# Patient Record
Sex: FEMALE | Race: WHITE | Employment: UNEMPLOYED | URBAN - NONMETROPOLITAN AREA
[De-identification: names, ages, dates, MRNs, and addresses within clinical notes are randomized per-mention and may not be internally consistent; named-entity substitution may affect disease eponyms.]

---

## 2019-07-16 VITALS
WEIGHT: 151 LBS | HEIGHT: 62 IN | TEMPERATURE: 98.4 F | SYSTOLIC BLOOD PRESSURE: 142 MMHG | DIASTOLIC BLOOD PRESSURE: 82 MMHG | BODY MASS INDEX: 27.79 KG/M2

## 2019-07-16 RX ORDER — METOPROLOL TARTRATE 50 MG/1
50 TABLET, FILM COATED ORAL 2 TIMES DAILY
COMMUNITY
End: 2019-07-17 | Stop reason: SDUPTHER

## 2019-07-16 RX ORDER — ATORVASTATIN CALCIUM 80 MG/1
80 TABLET, FILM COATED ORAL DAILY
COMMUNITY
End: 2019-07-17 | Stop reason: SDUPTHER

## 2019-07-16 RX ORDER — LORAZEPAM 2 MG/1
2 TABLET ORAL EVERY 6 HOURS PRN
COMMUNITY
End: 2019-07-17 | Stop reason: SDUPTHER

## 2019-07-17 ENCOUNTER — OFFICE VISIT (OUTPATIENT)
Dept: PRIMARY CARE CLINIC | Age: 59
End: 2019-07-17
Payer: COMMERCIAL

## 2019-07-17 VITALS
SYSTOLIC BLOOD PRESSURE: 122 MMHG | HEART RATE: 83 BPM | TEMPERATURE: 98.6 F | OXYGEN SATURATION: 94 % | DIASTOLIC BLOOD PRESSURE: 76 MMHG | HEIGHT: 62 IN | WEIGHT: 143 LBS | BODY MASS INDEX: 26.31 KG/M2

## 2019-07-17 DIAGNOSIS — Z12.12 ENCOUNTER FOR SCREENING FOR MALIGNANT NEOPLASM OF RECTUM: ICD-10-CM

## 2019-07-17 DIAGNOSIS — Z12.39 ENCOUNTER FOR SCREENING FOR MALIGNANT NEOPLASM OF BREAST: ICD-10-CM

## 2019-07-17 DIAGNOSIS — F41.9 ANXIETY: Primary | ICD-10-CM

## 2019-07-17 DIAGNOSIS — E78.2 MIXED HYPERLIPIDEMIA: ICD-10-CM

## 2019-07-17 DIAGNOSIS — I10 ESSENTIAL HYPERTENSION: ICD-10-CM

## 2019-07-17 PROCEDURE — 99214 OFFICE O/P EST MOD 30 MIN: CPT | Performed by: FAMILY MEDICINE

## 2019-07-17 RX ORDER — LORAZEPAM 2 MG/1
2 TABLET ORAL EVERY 6 HOURS PRN
Qty: 120 TABLET | Refills: 2 | Status: SHIPPED | OUTPATIENT
Start: 2019-07-17 | End: 2019-08-16

## 2019-07-17 RX ORDER — CLINDAMYCIN HYDROCHLORIDE 150 MG/1
150 CAPSULE ORAL 4 TIMES DAILY
Qty: 40 CAPSULE | Refills: 0 | Status: SHIPPED | OUTPATIENT
Start: 2019-07-17 | End: 2019-07-27

## 2019-07-17 RX ORDER — METOPROLOL TARTRATE 50 MG/1
50 TABLET, FILM COATED ORAL 2 TIMES DAILY
Qty: 30 TABLET | Refills: 3 | Status: SHIPPED | OUTPATIENT
Start: 2019-07-17 | End: 2019-07-22 | Stop reason: SDUPTHER

## 2019-07-17 RX ORDER — ATORVASTATIN CALCIUM 80 MG/1
80 TABLET, FILM COATED ORAL DAILY
Qty: 30 TABLET | Refills: 3 | Status: SHIPPED | OUTPATIENT
Start: 2019-07-17 | End: 2019-07-22 | Stop reason: SDUPTHER

## 2019-07-17 ASSESSMENT — PATIENT HEALTH QUESTIONNAIRE - PHQ9
1. LITTLE INTEREST OR PLEASURE IN DOING THINGS: 0
2. FEELING DOWN, DEPRESSED OR HOPELESS: 0
SUM OF ALL RESPONSES TO PHQ QUESTIONS 1-9: 0
SUM OF ALL RESPONSES TO PHQ QUESTIONS 1-9: 0
SUM OF ALL RESPONSES TO PHQ9 QUESTIONS 1 & 2: 0

## 2019-07-22 RX ORDER — ATORVASTATIN CALCIUM 80 MG/1
80 TABLET, FILM COATED ORAL DAILY
Qty: 30 TABLET | Refills: 3 | Status: SHIPPED | OUTPATIENT
Start: 2019-07-22 | End: 2019-10-16 | Stop reason: SDUPTHER

## 2019-07-22 RX ORDER — METOPROLOL TARTRATE 50 MG/1
50 TABLET, FILM COATED ORAL 2 TIMES DAILY
Qty: 30 TABLET | Refills: 3 | Status: SHIPPED | OUTPATIENT
Start: 2019-07-22 | End: 2019-08-26 | Stop reason: SDUPTHER

## 2019-08-26 RX ORDER — METOPROLOL TARTRATE 50 MG/1
50 TABLET, FILM COATED ORAL 2 TIMES DAILY
Qty: 60 TABLET | Refills: 3 | Status: SHIPPED | OUTPATIENT
Start: 2019-08-26 | End: 2019-10-16 | Stop reason: SDUPTHER

## 2019-08-26 RX ORDER — METOPROLOL TARTRATE 50 MG/1
50 TABLET, FILM COATED ORAL 2 TIMES DAILY
Qty: 60 TABLET | Refills: 3 | Status: SHIPPED | OUTPATIENT
Start: 2019-08-26 | End: 2019-08-26 | Stop reason: SDUPTHER

## 2019-10-16 ENCOUNTER — OFFICE VISIT (OUTPATIENT)
Dept: PRIMARY CARE CLINIC | Age: 59
End: 2019-10-16
Payer: COMMERCIAL

## 2019-10-16 VITALS
OXYGEN SATURATION: 95 % | BODY MASS INDEX: 24.11 KG/M2 | DIASTOLIC BLOOD PRESSURE: 68 MMHG | HEART RATE: 72 BPM | RESPIRATION RATE: 16 BRPM | WEIGHT: 131 LBS | TEMPERATURE: 98.2 F | SYSTOLIC BLOOD PRESSURE: 118 MMHG | HEIGHT: 62 IN

## 2019-10-16 DIAGNOSIS — E78.5 HYPERLIPIDEMIA, UNSPECIFIED HYPERLIPIDEMIA TYPE: ICD-10-CM

## 2019-10-16 DIAGNOSIS — F41.9 ANXIETY: ICD-10-CM

## 2019-10-16 DIAGNOSIS — I10 ESSENTIAL HYPERTENSION: Primary | ICD-10-CM

## 2019-10-16 DIAGNOSIS — Z12.39 ENCOUNTER FOR SCREENING FOR MALIGNANT NEOPLASM OF BREAST: ICD-10-CM

## 2019-10-16 DIAGNOSIS — Z12.11 ENCOUNTER FOR SCREENING FOR MALIGNANT NEOPLASM OF COLON: ICD-10-CM

## 2019-10-16 PROCEDURE — 99214 OFFICE O/P EST MOD 30 MIN: CPT | Performed by: FAMILY MEDICINE

## 2019-10-16 RX ORDER — METOPROLOL TARTRATE 50 MG/1
50 TABLET, FILM COATED ORAL 2 TIMES DAILY
Qty: 60 TABLET | Refills: 3 | Status: SHIPPED | OUTPATIENT
Start: 2019-10-16 | End: 2020-01-29 | Stop reason: SDUPTHER

## 2019-10-16 RX ORDER — ATORVASTATIN CALCIUM 80 MG/1
80 TABLET, FILM COATED ORAL DAILY
Qty: 30 TABLET | Refills: 3 | Status: SHIPPED | OUTPATIENT
Start: 2019-10-16 | End: 2019-11-22 | Stop reason: SDUPTHER

## 2019-11-22 RX ORDER — ATORVASTATIN CALCIUM 80 MG/1
80 TABLET, FILM COATED ORAL DAILY
Qty: 30 TABLET | Refills: 3 | Status: SHIPPED
Start: 2019-11-22 | End: 2020-04-21 | Stop reason: SDUPTHER

## 2020-01-29 ENCOUNTER — HOSPITAL ENCOUNTER (OUTPATIENT)
Age: 60
Discharge: HOME | End: 2020-01-31
Payer: COMMERCIAL

## 2020-01-29 ENCOUNTER — OFFICE VISIT (OUTPATIENT)
Dept: PRIMARY CARE CLINIC | Age: 60
End: 2020-01-29
Payer: COMMERCIAL

## 2020-01-29 VITALS
HEIGHT: 62 IN | TEMPERATURE: 97.4 F | DIASTOLIC BLOOD PRESSURE: 70 MMHG | BODY MASS INDEX: 23.74 KG/M2 | OXYGEN SATURATION: 95 % | HEART RATE: 64 BPM | WEIGHT: 129 LBS | SYSTOLIC BLOOD PRESSURE: 118 MMHG | RESPIRATION RATE: 18 BRPM

## 2020-01-29 LAB
AMPHETAMINE SCREEN, URINE: NOT DETECTED
BARBITURATE SCREEN URINE: NOT DETECTED
BENZODIAZEPINE SCREEN, URINE: NOT DETECTED
CANNABINOID SCREEN URINE: NOT DETECTED
COCAINE METABOLITE SCREEN URINE: NOT DETECTED
FENTANYL SCREEN, URINE: NOT DETECTED
Lab: NORMAL
METHADONE SCREEN, URINE: NOT DETECTED
OPIATE SCREEN URINE: NOT DETECTED
OXYCODONE URINE: NOT DETECTED
PHENCYCLIDINE SCREEN URINE: NOT DETECTED

## 2020-01-29 PROCEDURE — 99214 OFFICE O/P EST MOD 30 MIN: CPT | Performed by: FAMILY MEDICINE

## 2020-01-29 PROCEDURE — 80307 DRUG TEST PRSMV CHEM ANLYZR: CPT

## 2020-01-29 RX ORDER — METOPROLOL TARTRATE 50 MG/1
50 TABLET, FILM COATED ORAL 2 TIMES DAILY
Qty: 60 TABLET | Refills: 3 | Status: SHIPPED
Start: 2020-01-29 | End: 2020-04-21 | Stop reason: SDUPTHER

## 2020-01-29 ASSESSMENT — PATIENT HEALTH QUESTIONNAIRE - PHQ9
SUM OF ALL RESPONSES TO PHQ9 QUESTIONS 1 & 2: 0
SUM OF ALL RESPONSES TO PHQ QUESTIONS 1-9: 0
SUM OF ALL RESPONSES TO PHQ QUESTIONS 1-9: 0
1. LITTLE INTEREST OR PLEASURE IN DOING THINGS: 0
2. FEELING DOWN, DEPRESSED OR HOPELESS: 0
DEPRESSION UNABLE TO ASSESS: FUNCTIONAL CAPACITY MOTIVATION LIMITS ACCURACY

## 2020-01-29 NOTE — PROGRESS NOTES
Rfl: 3    Allergies   Allergen Reactions    Penicillin G        Past Medical History:   Diagnosis Date    Anxiety     Hyperlipidemia     Hypertension     Irritable bowel syndrome      Social History     Socioeconomic History    Marital status:      Spouse name: Not on file    Number of children: Not on file    Years of education: Not on file    Highest education level: Not on file   Occupational History     Employer: NOT EMPLOYED   Social Needs    Financial resource strain: Not on file    Food insecurity:     Worry: Not on file     Inability: Not on file    Transportation needs:     Medical: Not on file     Non-medical: Not on file   Tobacco Use    Smoking status: Former Smoker     Packs/day: 1.00     Years: 40.00     Pack years: 40.00     Types: Cigarettes     Last attempt to quit: 2017     Years since quittin.5    Smokeless tobacco: Never Used   Substance and Sexual Activity    Alcohol use: Not on file    Drug use: Not on file    Sexual activity: Not on file   Lifestyle    Physical activity:     Days per week: Not on file     Minutes per session: Not on file    Stress: Not on file   Relationships    Social connections:     Talks on phone: Not on file     Gets together: Not on file     Attends Holiness service: Not on file     Active member of club or organization: Not on file     Attends meetings of clubs or organizations: Not on file     Relationship status: Not on file    Intimate partner violence:     Fear of current or ex partner: Not on file     Emotionally abused: Not on file     Physically abused: Not on file     Forced sexual activity: Not on file   Other Topics Concern    Not on file   Social History Narrative    Not on file     No past surgical history on file. No family history on file.      Vitals:    20 1221   BP: 118/70   Pulse: 64   Resp: 18   Temp: 97.4 °F (36.3 °C)   SpO2: 95%   Weight: 129 lb (58.5 kg)   Height: 5' 2\" (1.575 m)        Exam: Const:

## 2020-04-21 ENCOUNTER — VIRTUAL VISIT (OUTPATIENT)
Dept: PRIMARY CARE CLINIC | Age: 60
End: 2020-04-21
Payer: COMMERCIAL

## 2020-04-21 PROCEDURE — 99441 PR PHYS/QHP TELEPHONE EVALUATION 5-10 MIN: CPT | Performed by: FAMILY MEDICINE

## 2020-04-21 RX ORDER — ATORVASTATIN CALCIUM 80 MG/1
80 TABLET, FILM COATED ORAL DAILY
Qty: 30 TABLET | Refills: 3 | Status: SHIPPED
Start: 2020-04-21 | End: 2020-07-30 | Stop reason: SDUPTHER

## 2020-04-21 RX ORDER — METOPROLOL TARTRATE 50 MG/1
50 TABLET, FILM COATED ORAL 2 TIMES DAILY
Qty: 60 TABLET | Refills: 3 | Status: SHIPPED
Start: 2020-04-21 | End: 2020-05-22 | Stop reason: SDUPTHER

## 2020-04-21 NOTE — PROGRESS NOTES
Angy Ahumada is a 60 y.o. female evaluated via telephone on 4/21/2020. Consent:  She and/or health care decision maker is aware that that she may receive a bill for this telephone service, depending on her insurance coverage, and has provided verbal consent to proceed: Yes      Documentation:  I communicated with the patient and/or health care decision maker about hypertension and hyperlipidemia. Details of this discussion including any medical advice provided: This 43-year-old female presents today for a follow-up evaluation of her chronic medical problems including hyperlipidemia and hypertension. Current medication list reviewed. The patient is tolerating all medications well without adverse events or known side effects. The patient is not up-to-date on all age-appropriate wellness issues. Assessment #1 hypertension #2 hyperlipidemia plan #1 refill medications #2 schedule office visit follow-up 3 months. I affirm this is a Patient Initiated Episode with an Established Patient who has not had a related appointment within my department in the past 7 days or scheduled within the next 24 hours. Total Time: minutes: 5-10 minutes.     Note: not billable if this call serves to triage the patient into an appointment for the relevant concern      Eli Petersen

## 2020-05-22 ENCOUNTER — VIRTUAL VISIT (OUTPATIENT)
Dept: PRIMARY CARE CLINIC | Age: 60
End: 2020-05-22
Payer: COMMERCIAL

## 2020-05-22 PROCEDURE — 99442 PR PHYS/QHP TELEPHONE EVALUATION 11-20 MIN: CPT | Performed by: FAMILY MEDICINE

## 2020-05-22 RX ORDER — METOPROLOL TARTRATE 50 MG/1
50 TABLET, FILM COATED ORAL 2 TIMES DAILY
Qty: 60 TABLET | Refills: 3 | Status: SHIPPED
Start: 2020-05-22 | End: 2020-09-10 | Stop reason: SDUPTHER

## 2020-07-30 RX ORDER — ATORVASTATIN CALCIUM 80 MG/1
80 TABLET, FILM COATED ORAL DAILY
Qty: 30 TABLET | Refills: 3 | Status: SHIPPED
Start: 2020-07-30 | End: 2020-07-30 | Stop reason: SDUPTHER

## 2020-07-30 RX ORDER — ATORVASTATIN CALCIUM 80 MG/1
80 TABLET, FILM COATED ORAL DAILY
Qty: 30 TABLET | Refills: 3 | Status: SHIPPED
Start: 2020-07-30 | End: 2020-09-10 | Stop reason: SDUPTHER

## 2020-07-30 NOTE — TELEPHONE ENCOUNTER
Last Appointment:  1/29/2020  Future Appointments   Date Time Provider Stalin Tsai   8/24/2020 12:20 PM Sindy Cavazos  [facility]

## 2020-08-18 ENCOUNTER — TELEPHONE (OUTPATIENT)
Dept: PRIMARY CARE CLINIC | Age: 60
End: 2020-08-18

## 2020-08-18 RX ORDER — CLINDAMYCIN HYDROCHLORIDE 150 MG/1
150 CAPSULE ORAL 4 TIMES DAILY
Qty: 40 CAPSULE | Refills: 0 | Status: SHIPPED | OUTPATIENT
Start: 2020-08-18 | End: 2020-08-28

## 2020-08-18 NOTE — TELEPHONE ENCOUNTER
Patient is calling in requesting an antibiotic, she states she has bad teeth and she cannot eat or sleep.

## 2020-09-08 RX ORDER — TICAGRELOR 90 MG/1
TABLET ORAL
Qty: 60 TABLET | Refills: 0 | Status: SHIPPED
Start: 2020-09-08 | End: 2020-09-10 | Stop reason: SDUPTHER

## 2020-09-10 ENCOUNTER — HOSPITAL ENCOUNTER (OUTPATIENT)
Age: 60
Discharge: HOME | End: 2020-09-12
Payer: COMMERCIAL

## 2020-09-10 ENCOUNTER — OFFICE VISIT (OUTPATIENT)
Dept: PRIMARY CARE CLINIC | Age: 60
End: 2020-09-10
Payer: COMMERCIAL

## 2020-09-10 VITALS
DIASTOLIC BLOOD PRESSURE: 70 MMHG | WEIGHT: 138 LBS | BODY MASS INDEX: 24.45 KG/M2 | HEART RATE: 71 BPM | SYSTOLIC BLOOD PRESSURE: 132 MMHG | HEIGHT: 63 IN | OXYGEN SATURATION: 95 % | RESPIRATION RATE: 16 BRPM

## 2020-09-10 LAB
ANION GAP SERPL CALCULATED.3IONS-SCNC: 13 MMOL/L (ref 7–16)
BUN BLDV-MCNC: 12 MG/DL (ref 8–23)
CALCIUM SERPL-MCNC: 9.6 MG/DL (ref 8.6–10.2)
CHLORIDE BLD-SCNC: 104 MMOL/L (ref 98–107)
CHOLESTEROL, TOTAL: 135 MG/DL (ref 0–199)
CO2: 25 MMOL/L (ref 22–29)
CREAT SERPL-MCNC: 0.6 MG/DL (ref 0.5–1)
GFR AFRICAN AMERICAN: >60
GFR NON-AFRICAN AMERICAN: >60 ML/MIN/1.73
GLUCOSE BLD-MCNC: 86 MG/DL (ref 74–99)
HDLC SERPL-MCNC: 39 MG/DL
LDL CHOLESTEROL CALCULATED: 69 MG/DL (ref 0–99)
POTASSIUM SERPL-SCNC: 4.4 MMOL/L (ref 3.5–5)
SODIUM BLD-SCNC: 142 MMOL/L (ref 132–146)
TRIGL SERPL-MCNC: 134 MG/DL (ref 0–149)
VLDLC SERPL CALC-MCNC: 27 MG/DL

## 2020-09-10 PROCEDURE — 86803 HEPATITIS C AB TEST: CPT

## 2020-09-10 PROCEDURE — 80061 LIPID PANEL: CPT

## 2020-09-10 PROCEDURE — 80048 BASIC METABOLIC PNL TOTAL CA: CPT

## 2020-09-10 PROCEDURE — 99214 OFFICE O/P EST MOD 30 MIN: CPT | Performed by: FAMILY MEDICINE

## 2020-09-10 PROCEDURE — 86703 HIV-1/HIV-2 1 RESULT ANTBDY: CPT

## 2020-09-10 PROCEDURE — 36415 COLL VENOUS BLD VENIPUNCTURE: CPT

## 2020-09-10 RX ORDER — METOPROLOL TARTRATE 50 MG/1
50 TABLET, FILM COATED ORAL 2 TIMES DAILY
Qty: 60 TABLET | Refills: 3 | Status: SHIPPED
Start: 2020-09-10 | End: 2020-12-14

## 2020-09-10 RX ORDER — ATORVASTATIN CALCIUM 80 MG/1
80 TABLET, FILM COATED ORAL DAILY
Qty: 30 TABLET | Refills: 3 | Status: SHIPPED
Start: 2020-09-10 | End: 2021-01-05 | Stop reason: SDUPTHER

## 2020-09-10 NOTE — PROGRESS NOTES
9/10/2020     Isis Cosme    : 1960 Sex: female   Age: 63 y.o. Chief Complaint   Patient presents with    Anxiety    Hypertension       HPI: This 63y.o. -year-old female  presents today for evaluation and management of her  chronic medical problems. Current medication list reviewed. The patient is tolerating all medications well without adverse events or known side effects. The patient does understand the risk and benefits of the prescribed medications. The patient is up-to-date on all age-appropriate wellness issues. ROS:   Const: Denies changes in appetite, chills, fever, night sweats and weight loss. Eyes:  Denies discharge, a recent change in visual acuity, blurred vision and double vision. ENMT: Denies discharge of the ears, hearing loss, pain of the ears. Denies nasal or sinus symptoms other than stated above. Denies mouth or throat symptoms. CV:  Denies chest pain, dyspnea on exertion, orthopnea, palpitations and PND  Resp: Denies chest pain, cough, SOB and wheezing. GI: Denies abdominal pain, constipation, diarrhea, heartburn, indigestion, nausea and vomiting. : Denies dysuria, frequency, hematuria, nocturia and urgency. Musculo: Denies arthralgias and myalgia  Skin:  Denies lesions, pruritus and rash. Neuro: Denies dizziness, lightheadedness, numbness, tingling and weakness. Psych:  Denies anxiety and depression  Endocrine: Denies anxiety and depression. Hema/Lymph: Denies hematologic symptoms  Allergy/Immuno:  Denies allergic/immunologic symptoms.   Pertinent positives reviewed and noted      Current Outpatient Medications:     atorvastatin (LIPITOR) 80 MG tablet, Take 1 tablet by mouth daily, Disp: 30 tablet, Rfl: 3    ticagrelor (BRILINTA) 90 MG TABS tablet, TAKE ONE TABLET BY MOUTH TWO TIMES A DAY, Disp: 60 tablet, Rfl: 3    metoprolol tartrate (LOPRESSOR) 50 MG tablet, Take 1 tablet by mouth 2 times daily, Disp: 60 tablet, Rfl: 3    Allergies   Allergen Reactions    Penicillin G        Past Medical History:   Diagnosis Date    Anxiety     Hyperlipidemia     Hypertension     Irritable bowel syndrome      Social History     Socioeconomic History    Marital status:      Spouse name: Not on file    Number of children: Not on file    Years of education: Not on file    Highest education level: Not on file   Occupational History     Employer: NOT EMPLOYED   Social Needs    Financial resource strain: Not on file    Food insecurity     Worry: Not on file     Inability: Not on file    Transportation needs     Medical: Not on file     Non-medical: Not on file   Tobacco Use    Smoking status: Former Smoker     Packs/day: 1.00     Years: 40.00     Pack years: 40.00     Types: Cigarettes     Last attempt to quit: 7/17/2017     Years since quitting: 3.1    Smokeless tobacco: Never Used   Substance and Sexual Activity    Alcohol use: Not on file    Drug use: Not on file    Sexual activity: Not on file   Lifestyle    Physical activity     Days per week: Not on file     Minutes per session: Not on file    Stress: Not on file   Relationships    Social connections     Talks on phone: Not on file     Gets together: Not on file     Attends Denominational service: Not on file     Active member of club or organization: Not on file     Attends meetings of clubs or organizations: Not on file     Relationship status: Not on file    Intimate partner violence     Fear of current or ex partner: Not on file     Emotionally abused: Not on file     Physically abused: Not on file     Forced sexual activity: Not on file   Other Topics Concern    Not on file   Social History Narrative    Not on file     History reviewed. No pertinent surgical history. History reviewed. No pertinent family history. Vitals:    09/10/20 0831   BP: 132/70   Pulse: 71   Resp: 16   SpO2: 95%   Weight: 138 lb (62.6 kg)   Height: 5' 2.5\" (1.588 m)        Exam: Const: Appears healthy and well developed.   No signs of acute distress present. Eyes: PERRL  ENMT: Tympanic membranes are intact. Nasal mucosa intact without noted erythema Septum is in the midline. Posterior pharynx shows no exudate, irritation or redness. Neck:  Supple without adenopathy. Adequate range of motion   Resp: No rales, rhonchi, wheezes appreciated over the lungs bilaterally. CV: S1, S2 within normal limits. Regular rate and rhythm noted. Without murmur, gallop or rub. Extremities:  Pulses intact. Without noted edema. Abdomen: Positive bowel sounds. Palpation reveals softness, with no distension, organomegaly or tenderness. No abdominal masses palpable. Skin: Skin is warm and dry. Musculo: Unchanged upon examination. Neuro: Alert and oriented X3. Cranial nerves grossly intact. Psych: Mood is normal.  Affect is normal.   Vital signs reviewed. Controlled Substances Monitoring:     No flowsheet data found. Plan Per Assessment:  Benson Schlatter was seen today for anxiety and hypertension. Diagnoses and all orders for this visit:    Essential hypertension  -     Basic Metabolic Panel; Future  -     Lipid Panel; Future    Hyperlipidemia, unspecified hyperlipidemia type  -     Lipid Panel; Future    Anxiety    Encounter for screening for HIV  -     HIV Screen; Future    Encounter for hepatitis C screening test for low risk patient  -     Hepatitis C Antibody; Future    Other orders  -     atorvastatin (LIPITOR) 80 MG tablet; Take 1 tablet by mouth daily  -     ticagrelor (BRILINTA) 90 MG TABS tablet; TAKE ONE TABLET BY MOUTH TWO TIMES A DAY  -     metoprolol tartrate (LOPRESSOR) 50 MG tablet; Take 1 tablet by mouth 2 times daily        Return in about 3 months (around 12/10/2020) for MEDICATION CHECK, FOLLOW UP [address] [facility] Yoselin oMmin MD    Note was generated with the assistance of voice recognition software. Document was reviewed however may contain grammatical errors.

## 2020-09-11 LAB
HEPATITIS C ANTIBODY INTERPRETATION: NORMAL
HIV-1 AND HIV-2 ANTIBODIES: NORMAL

## 2020-11-18 NOTE — TELEPHONE ENCOUNTER
Last Appointment:  9/10/2020  Future Appointments   Date Time Provider Stalin Tsai   12/16/2020  9:20 AM Awais Jane  [facility]      Patient needs pended med refilled.     Electronically signed by Jaleesa Valle LPN on 84/55/2227 at 10:19 AM

## 2020-12-14 RX ORDER — METOPROLOL TARTRATE 50 MG/1
TABLET, FILM COATED ORAL
Qty: 60 TABLET | Refills: 0 | Status: SHIPPED
Start: 2020-12-14 | End: 2020-12-16 | Stop reason: SDUPTHER

## 2020-12-16 RX ORDER — METOPROLOL TARTRATE 50 MG/1
TABLET, FILM COATED ORAL
Qty: 60 TABLET | Refills: 0 | Status: SHIPPED
Start: 2020-12-16 | End: 2021-01-05 | Stop reason: SDUPTHER

## 2021-01-05 ENCOUNTER — OFFICE VISIT (OUTPATIENT)
Dept: PRIMARY CARE CLINIC | Age: 61
End: 2021-01-05
Payer: COMMERCIAL

## 2021-01-05 VITALS
HEIGHT: 62 IN | BODY MASS INDEX: 22.63 KG/M2 | DIASTOLIC BLOOD PRESSURE: 68 MMHG | WEIGHT: 123 LBS | RESPIRATION RATE: 16 BRPM | SYSTOLIC BLOOD PRESSURE: 124 MMHG

## 2021-01-05 DIAGNOSIS — I10 ESSENTIAL HYPERTENSION: Primary | ICD-10-CM

## 2021-01-05 DIAGNOSIS — E78.5 HYPERLIPIDEMIA, UNSPECIFIED HYPERLIPIDEMIA TYPE: ICD-10-CM

## 2021-01-05 PROCEDURE — 99213 OFFICE O/P EST LOW 20 MIN: CPT | Performed by: FAMILY MEDICINE

## 2021-01-05 RX ORDER — ATORVASTATIN CALCIUM 80 MG/1
80 TABLET, FILM COATED ORAL DAILY
Qty: 30 TABLET | Refills: 3 | Status: SHIPPED
Start: 2021-01-05 | End: 2021-04-14 | Stop reason: SDUPTHER

## 2021-01-05 RX ORDER — METOPROLOL TARTRATE 50 MG/1
TABLET, FILM COATED ORAL
Qty: 60 TABLET | Refills: 0 | Status: SHIPPED
Start: 2021-01-05 | End: 2021-02-11

## 2021-01-05 ASSESSMENT — PATIENT HEALTH QUESTIONNAIRE - PHQ9
SUM OF ALL RESPONSES TO PHQ QUESTIONS 1-9: 0
2. FEELING DOWN, DEPRESSED OR HOPELESS: 0
SUM OF ALL RESPONSES TO PHQ QUESTIONS 1-9: 0

## 2021-01-05 NOTE — PROGRESS NOTES
TABS tablet, TAKE ONE TABLET BY MOUTH TWO TIMES A DAY, Disp: 60 tablet, Rfl: 3    Allergies   Allergen Reactions    Penicillin G        Past Medical History:   Diagnosis Date    Anxiety     Hyperlipidemia     Hypertension     Irritable bowel syndrome      Social History     Socioeconomic History    Marital status:      Spouse name: Not on file    Number of children: Not on file    Years of education: Not on file    Highest education level: Not on file   Occupational History     Employer: NOT EMPLOYED   Social Needs    Financial resource strain: Not on file    Food insecurity     Worry: Not on file     Inability: Not on file    Transportation needs     Medical: Not on file     Non-medical: Not on file   Tobacco Use    Smoking status: Former Smoker     Packs/day: 1.00     Years: 40.00     Pack years: 40.00     Types: Cigarettes     Quit date: 7/17/2017     Years since quitting: 3.4    Smokeless tobacco: Never Used   Substance and Sexual Activity    Alcohol use: Not on file    Drug use: Not on file    Sexual activity: Not on file   Lifestyle    Physical activity     Days per week: Not on file     Minutes per session: Not on file    Stress: Not on file   Relationships    Social connections     Talks on phone: Not on file     Gets together: Not on file     Attends Buddhism service: Not on file     Active member of club or organization: Not on file     Attends meetings of clubs or organizations: Not on file     Relationship status: Not on file    Intimate partner violence     Fear of current or ex partner: Not on file     Emotionally abused: Not on file     Physically abused: Not on file     Forced sexual activity: Not on file   Other Topics Concern    Not on file   Social History Narrative    Not on file     History reviewed. No pertinent surgical history. History reviewed. No pertinent family history.      Vitals:    01/05/21 1256   BP: 124/68   Resp: 16   Weight: 123 lb (55.8 kg) Height: 5' 2\" (1.575 m)        Exam: Const: Appears healthy and well developed. No signs of acute distress present. Eyes: PERRL  ENMT: Tympanic membranes are intact. Nasal mucosa intact without noted erythema Septum is in the midline. Posterior pharynx shows no exudate, irritation or redness. Neck:  Supple without adenopathy. Adequate range of motion   Resp: No rales, rhonchi, wheezes appreciated over the lungs bilaterally. CV: S1, S2 within normal limits. Regular rate and rhythm noted. Without murmur, gallop or rub. Extremities:  Pulses intact. Without noted edema. Abdomen: Positive bowel sounds. Palpation reveals softness, with no distension, organomegaly or tenderness. No abdominal masses palpable. Skin: Skin is warm and dry. Musculo: Unchanged upon examination. Neuro: Alert and oriented X3. Cranial nerves grossly intact. Psych: Mood is normal.  Affect is normal.   Vital signs reviewed. Controlled Substances Monitoring:     No flowsheet data found. Plan Per Assessment:  Jeanice Epley was seen today for hyperlipidemia and hypertension. Diagnoses and all orders for this visit:    Essential hypertension    Hyperlipidemia, unspecified hyperlipidemia type    Other orders  -     atorvastatin (LIPITOR) 80 MG tablet; Take 1 tablet by mouth daily  -     metoprolol tartrate (LOPRESSOR) 50 MG tablet; TAKE ONE TABLET BY MOUTH TWO TIMES A DAY  -     ticagrelor (BRILINTA) 90 MG TABS tablet; TAKE ONE TABLET BY MOUTH TWO TIMES A DAY        Return in about 3 months (around 4/5/2021) for MEDICATION CHECK, FOLLOW UP CHRONIC MEDICAL PROBLEMS. Daniel Young MD    Note was generated with the assistance of voice recognition software. Document was reviewed however may contain grammatical errors.

## 2021-02-11 RX ORDER — METOPROLOL TARTRATE 50 MG/1
TABLET, FILM COATED ORAL
Qty: 60 TABLET | Refills: 0 | Status: SHIPPED
Start: 2021-02-11 | End: 2021-03-17 | Stop reason: SDUPTHER

## 2021-02-11 NOTE — TELEPHONE ENCOUNTER
Last Appointment:  1/5/2021  Future Appointments   Date Time Provider Stalin Tsai   4/13/2021 12:10 PM Hanna Gaytan  [facility]

## 2021-03-17 RX ORDER — METOPROLOL TARTRATE 50 MG/1
TABLET, FILM COATED ORAL
Qty: 60 TABLET | Refills: 0 | Status: SHIPPED
Start: 2021-03-17 | End: 2021-04-14 | Stop reason: SDUPTHER

## 2021-04-14 ENCOUNTER — TELEPHONE (OUTPATIENT)
Dept: ADMINISTRATIVE | Age: 61
End: 2021-04-14

## 2021-04-14 RX ORDER — ATORVASTATIN CALCIUM 80 MG/1
80 TABLET, FILM COATED ORAL DAILY
Qty: 30 TABLET | Refills: 3 | Status: SHIPPED
Start: 2021-04-14 | End: 2021-04-20 | Stop reason: SDUPTHER

## 2021-04-14 RX ORDER — METOPROLOL TARTRATE 50 MG/1
TABLET, FILM COATED ORAL
Qty: 60 TABLET | Refills: 0 | Status: SHIPPED
Start: 2021-04-14 | End: 2021-04-20 | Stop reason: SDUPTHER

## 2021-04-14 NOTE — TELEPHONE ENCOUNTER
Patient request refill of the following:    metoprolol tartrate (LOPRESSOR) 50 MG tablet  atorvastatin (LIPITOR) 80 MG tablet  ticagrelor (BRILINTA) 90 MG TABS tablet      [facility] [place]

## 2021-04-14 NOTE — TELEPHONE ENCOUNTER
Last Appointment:  4/13/2021  Future Appointments   Date Time Provider Stalin Tsai   4/20/2021 10:50 AM Susie Maier  [facility]

## 2021-04-20 ENCOUNTER — OFFICE VISIT (OUTPATIENT)
Dept: PRIMARY CARE CLINIC | Age: 61
End: 2021-04-20
Payer: COMMERCIAL

## 2021-04-20 VITALS
DIASTOLIC BLOOD PRESSURE: 64 MMHG | BODY MASS INDEX: 21.79 KG/M2 | HEIGHT: 63 IN | SYSTOLIC BLOOD PRESSURE: 122 MMHG | RESPIRATION RATE: 16 BRPM | WEIGHT: 123 LBS | HEART RATE: 87 BPM | OXYGEN SATURATION: 90 %

## 2021-04-20 DIAGNOSIS — E78.5 HYPERLIPIDEMIA, UNSPECIFIED HYPERLIPIDEMIA TYPE: ICD-10-CM

## 2021-04-20 DIAGNOSIS — Z12.12 ENCOUNTER FOR SCREENING FOR MALIGNANT NEOPLASM OF RECTUM: ICD-10-CM

## 2021-04-20 DIAGNOSIS — I10 ESSENTIAL HYPERTENSION: Primary | ICD-10-CM

## 2021-04-20 DIAGNOSIS — Z12.31 ENCOUNTER FOR SCREENING MAMMOGRAM FOR MALIGNANT NEOPLASM OF BREAST: ICD-10-CM

## 2021-04-20 DIAGNOSIS — F41.9 ANXIETY: ICD-10-CM

## 2021-04-20 PROCEDURE — 99214 OFFICE O/P EST MOD 30 MIN: CPT | Performed by: FAMILY MEDICINE

## 2021-04-20 RX ORDER — METOPROLOL TARTRATE 50 MG/1
TABLET, FILM COATED ORAL
Qty: 60 TABLET | Refills: 0 | Status: SHIPPED
Start: 2021-04-20 | End: 2021-06-14 | Stop reason: SDUPTHER

## 2021-04-20 RX ORDER — ATORVASTATIN CALCIUM 80 MG/1
80 TABLET, FILM COATED ORAL DAILY
Qty: 30 TABLET | Refills: 3 | Status: SHIPPED | OUTPATIENT
Start: 2021-04-20 | End: 2021-09-10 | Stop reason: SDUPTHER

## 2021-04-20 NOTE — PROGRESS NOTES
2021     Zoila Phillips    : 1960 Sex: female   Age: 63 y.o. Chief Complaint   Patient presents with    Hypertension       HPI: This 63y.o. -year-old female  presents today for evaluation and management of her  chronic medical problems. Current medication list reviewed. The patient is tolerating all medications well without adverse events or known side effects. The patient does understand the risk and benefits of the prescribed medications. The patient is not up-to-date on all age-appropriate wellness issues. ROS:   Const: Denies changes in appetite, chills, fever, night sweats and weight loss. Eyes:  Denies discharge, a recent change in visual acuity, blurred vision and double vision. ENMT: Denies discharge of the ears, hearing loss, pain of the ears. Denies nasal or sinus symptoms other than stated above. Denies mouth or throat symptoms. CV:  Denies chest pain, dyspnea on exertion, orthopnea, palpitations and PND  Resp: Denies chest pain, cough, SOB and wheezing. GI: Denies abdominal pain, constipation, diarrhea, heartburn, indigestion, nausea and vomiting. : Denies dysuria, frequency, hematuria, nocturia and urgency. Musculo: Denies arthralgias and myalgia  Skin:  Denies lesions, pruritus and rash. Neuro: Denies dizziness, lightheadedness, numbness, tingling and weakness. Psych:  Denies anxiety and depression  Endocrine: Denies polyuria, polydipsia, polyphagia, weight gain, dry skin, constipation, fatigue, cold intolerance, heat intolerance or tremors. Hema/Lymph: Denies hematologic symptoms  Allergy/Immuno:  Denies allergic/immunologic symptoms.   Pertinent positives reviewed and noted      Current Outpatient Medications:     metoprolol tartrate (LOPRESSOR) 50 MG tablet, TAKE 1 TABLET BY MOUTH TWICE DAILY, Disp: 60 tablet, Rfl: 0    ticagrelor (BRILINTA) 90 MG TABS tablet, TAKE ONE TABLET BY MOUTH TWO TIMES A DAY, Disp: 60 tablet, Rfl: 3    atorvastatin (LIPITOR) 80 MG tablet, grammatical errors.

## 2021-06-14 RX ORDER — METOPROLOL TARTRATE 50 MG/1
TABLET, FILM COATED ORAL
Qty: 60 TABLET | Refills: 0 | Status: SHIPPED
Start: 2021-06-14 | End: 2021-07-16 | Stop reason: SDUPTHER

## 2021-06-14 NOTE — TELEPHONE ENCOUNTER
Pt was given new patient line to establish with new provider. She stated she was out of medication yesterday and needs this medication ASAP.

## 2021-07-16 RX ORDER — METOPROLOL TARTRATE 50 MG/1
TABLET, FILM COATED ORAL
Qty: 60 TABLET | Refills: 0 | Status: SHIPPED
Start: 2021-07-16 | End: 2021-08-18 | Stop reason: SDUPTHER

## 2021-08-18 RX ORDER — METOPROLOL TARTRATE 50 MG/1
TABLET, FILM COATED ORAL
Qty: 60 TABLET | Refills: 0 | Status: SHIPPED
Start: 2021-08-18 | End: 2021-09-10 | Stop reason: SDUPTHER

## 2021-09-10 ENCOUNTER — VIRTUAL VISIT (OUTPATIENT)
Dept: PRIMARY CARE CLINIC | Age: 61
End: 2021-09-10
Payer: MEDICAID

## 2021-09-10 DIAGNOSIS — Z12.11 SCREENING FOR COLON CANCER: ICD-10-CM

## 2021-09-10 DIAGNOSIS — R41.3 MEMORY LOSS, SHORT TERM: ICD-10-CM

## 2021-09-10 DIAGNOSIS — I10 ESSENTIAL HYPERTENSION: Primary | ICD-10-CM

## 2021-09-10 DIAGNOSIS — E78.5 HYPERLIPIDEMIA, UNSPECIFIED HYPERLIPIDEMIA TYPE: ICD-10-CM

## 2021-09-10 DIAGNOSIS — I25.10 CORONARY ARTERY DISEASE, UNSPECIFIED VESSEL OR LESION TYPE, UNSPECIFIED WHETHER ANGINA PRESENT, UNSPECIFIED WHETHER NATIVE OR TRANSPLANTED HEART: ICD-10-CM

## 2021-09-10 DIAGNOSIS — Z72.0 TOBACCO ABUSE: ICD-10-CM

## 2021-09-10 PROCEDURE — 99214 OFFICE O/P EST MOD 30 MIN: CPT | Performed by: NURSE PRACTITIONER

## 2021-09-10 RX ORDER — ATORVASTATIN CALCIUM 80 MG/1
80 TABLET, FILM COATED ORAL DAILY
Qty: 30 TABLET | Refills: 3 | Status: SHIPPED
Start: 2021-09-10 | End: 2021-10-28 | Stop reason: SDUPTHER

## 2021-09-10 RX ORDER — METOPROLOL TARTRATE 50 MG/1
TABLET, FILM COATED ORAL
Qty: 60 TABLET | Refills: 0 | Status: SHIPPED
Start: 2021-09-10 | Stop reason: SDUPTHER

## 2021-09-10 SDOH — ECONOMIC STABILITY: FOOD INSECURITY: WITHIN THE PAST 12 MONTHS, THE FOOD YOU BOUGHT JUST DIDN'T LAST AND YOU DIDN'T HAVE MONEY TO GET MORE.: NEVER TRUE

## 2021-09-10 SDOH — ECONOMIC STABILITY: FOOD INSECURITY: WITHIN THE PAST 12 MONTHS, YOU WORRIED THAT YOUR FOOD WOULD RUN OUT BEFORE YOU GOT MONEY TO BUY MORE.: NEVER TRUE

## 2021-09-10 ASSESSMENT — SOCIAL DETERMINANTS OF HEALTH (SDOH): HOW HARD IS IT FOR YOU TO PAY FOR THE VERY BASICS LIKE FOOD, HOUSING, MEDICAL CARE, AND HEATING?: NOT HARD AT ALL

## 2021-09-10 NOTE — PROGRESS NOTES
Vik Graham is a 57 y.o. female evaluated via telephone on 9/10/2021. Consent:  She and/or health care decision maker is aware that that she may receive a bill for this telephone service, depending on her insurance coverage, and has provided verbal consent to proceed: Yes      Documentation:  I communicated with the patient and/or health care decision maker about hypertension, hyperlipidemia, CAD and short-term memory loss. Details of this discussion including any medical advice provided: Patient is taking all medications as prescribed. She does not check her blood pressures at home. She is a current everyday smoker smoking about 1 pack a day, with a 40-pack-year history. She denies any chest pain, shortness of breath, wheezing. She also has an additional complaint of worsening ongoing short-term memory loss. Patient was unable to recall 3 words during the exam.  We will obtain a Mini-Mental exam in office when she arrives for blood work and EKG. Patient is also agreeable to Cologuard screening for colon cancer. Maciel Casas was seen today for establish care and hypertension. Diagnoses and all orders for this visit:    Essential hypertension  -     metoprolol tartrate (LOPRESSOR) 50 MG tablet; TAKE 1 TABLET BY MOUTH TWICE DAILY  -     EKG 12 Lead; Future    Hyperlipidemia, unspecified hyperlipidemia type  -     atorvastatin (LIPITOR) 80 MG tablet; Take 1 tablet by mouth daily  -     EKG 12 Lead; Future  -     Lipid Panel; Future    Coronary artery disease, unspecified vessel or lesion type, unspecified whether angina present, unspecified whether native or transplanted heart  -     metoprolol tartrate (LOPRESSOR) 50 MG tablet; TAKE 1 TABLET BY MOUTH TWICE DAILY  -     ticagrelor (BRILINTA) 90 MG TABS tablet; TAKE ONE TABLET BY MOUTH TWO TIMES A DAY  -     atorvastatin (LIPITOR) 80 MG tablet; Take 1 tablet by mouth daily    Memory loss, short term  -     COCO;  Future  -     CBC Auto Differential; Future  - Comprehensive Metabolic Panel; Future  -     Vitamin B12 & Folate; Future  -     TSH without Reflex; Future  -     Sedimentation Rate; Future  -     RPR Reflex to Titer and TPPA; Future  -     HIV Screen; Future  -     HEMOGLOBIN A1C; Future    Tobacco abuse  -     CT Lung Screen (Initial/Annual); Future    Screening for colon cancer  -     Cologuard (For External Results Only); Future      I affirm this is a Patient Initiated Episode with a Patient who has not had a related appointment within my department in the past 7 days or scheduled within the next 24 hours. Patient identification was verified at the start of the visit: Yes    Total Time: minutes: 21-30 minutes    The visit was conducted pursuant to the emergency declaration under the [facility], [address] [place] [address] authority and the Network and [employer] Act. Patient identification was verified, and a caregiver was present when appropriate. The patient was located in a state where the provider was credentialed to provide care.     Note: not billable if this call serves to triage the patient into an appointment for the relevant concern      DAYO Damon NP

## 2021-10-05 ENCOUNTER — TELEPHONE (OUTPATIENT)
Dept: CASE MANAGEMENT | Age: 61
End: 2021-10-05

## 2021-10-05 NOTE — TELEPHONE ENCOUNTER
I called the patient and she confirmed her CT lung screening at [facility] on 10/6/2021 at 7:00 am.  I reminded the patient to arrive at 6:30 am, enter through the main entrance, and register. Patient confirmed.         Electronically signed by Paulo Azevedo on 10/5/21 at 10:21 AM EDT

## 2021-10-06 ENCOUNTER — HOSPITAL ENCOUNTER (OUTPATIENT)
Dept: CT IMAGING | Age: 61
Discharge: HOME | End: 2021-10-08
Payer: MEDICAID

## 2021-10-06 DIAGNOSIS — Z72.0 TOBACCO ABUSE: ICD-10-CM

## 2021-10-06 PROCEDURE — 71271 CT THORAX LUNG CANCER SCR C-: CPT

## 2021-10-07 ENCOUNTER — TELEPHONE (OUTPATIENT)
Dept: CASE MANAGEMENT | Age: 61
End: 2021-10-07

## 2021-10-07 NOTE — TELEPHONE ENCOUNTER
No call, encounter opened to process CT Lung Screening. CT Lung Screen: 10/6/2021    Impression   Moderate emphysematous changes with significant respiratory motion artifact,   limiting assessment for small pulmonary nodules.  Within this limitation,   there is no convincing evidence of a noncalcified pulmonary nodule.       Incidental findings as above.       LUNG RADS:   Per ACR Lung-RADS Version 1.1       Category 1, Negative (No nodules and definitely benign nodules).       Management: Continue annual lung screening with LDCT in 12 months.       RECOMMENDATIONS:   If you would [facility] to [facility], please contact the Nurse Navigator at   [phone]. Pack years: 36    Social History     Tobacco Use  Smoking Status: Former Smoker    Start Date:    Quit Date: 07/17/2017   Types: Cigarettes   Packs/Day: 1   Years: 36   Pack Years: 36   Smokeless Tobacco: Never used         Results letter sent to patient via my chart or mailed.      [facility]

## 2021-10-20 DIAGNOSIS — I25.10 CORONARY ARTERY DISEASE, UNSPECIFIED VESSEL OR LESION TYPE, UNSPECIFIED WHETHER ANGINA PRESENT, UNSPECIFIED WHETHER NATIVE OR TRANSPLANTED HEART: ICD-10-CM

## 2021-10-20 DIAGNOSIS — I10 ESSENTIAL HYPERTENSION: ICD-10-CM

## 2021-10-20 RX ORDER — METOPROLOL TARTRATE 50 MG/1
TABLET, FILM COATED ORAL
Qty: 60 TABLET | Refills: 0 | Status: SHIPPED
Start: 2021-10-20 | End: 2021-10-28 | Stop reason: SDUPTHER

## 2021-10-20 NOTE — TELEPHONE ENCOUNTER
Name of Medication(s) Requested:  metoprolol    Pharmacy Requested:   [facility]    Medication(s) pended? [x] Yes  [] No    Last Appointment:  9/10/2021    Future appts:  Future Appointments   Date Time Provider Stalin Tsai   11/9/2021 10:30 AM DAYO Dumont - NP [facility]          Does patient need call back?   [] Yes  [x] No

## 2021-10-28 DIAGNOSIS — I25.10 CORONARY ARTERY DISEASE, UNSPECIFIED VESSEL OR LESION TYPE, UNSPECIFIED WHETHER ANGINA PRESENT, UNSPECIFIED WHETHER NATIVE OR TRANSPLANTED HEART: ICD-10-CM

## 2021-10-28 DIAGNOSIS — I10 ESSENTIAL HYPERTENSION: ICD-10-CM

## 2021-10-28 DIAGNOSIS — E78.5 HYPERLIPIDEMIA, UNSPECIFIED HYPERLIPIDEMIA TYPE: ICD-10-CM

## 2021-10-28 RX ORDER — ATORVASTATIN CALCIUM 80 MG/1
80 TABLET, FILM COATED ORAL DAILY
Qty: 30 TABLET | Refills: 3 | Status: SHIPPED
Start: 2021-10-28 | End: 2021-11-09 | Stop reason: SDUPTHER

## 2021-10-28 RX ORDER — METOPROLOL TARTRATE 50 MG/1
TABLET, FILM COATED ORAL
Qty: 60 TABLET | Refills: 3 | Status: SHIPPED
Start: 2021-10-28 | End: 2021-11-09 | Stop reason: SDUPTHER

## 2021-11-09 ENCOUNTER — OFFICE VISIT (OUTPATIENT)
Dept: PRIMARY CARE CLINIC | Age: 61
End: 2021-11-09
Payer: MEDICAID

## 2021-11-09 VITALS
BODY MASS INDEX: 22.08 KG/M2 | SYSTOLIC BLOOD PRESSURE: 128 MMHG | RESPIRATION RATE: 16 BRPM | DIASTOLIC BLOOD PRESSURE: 74 MMHG | OXYGEN SATURATION: 93 % | TEMPERATURE: 97.4 F | HEIGHT: 62 IN | HEART RATE: 56 BPM | WEIGHT: 120 LBS

## 2021-11-09 DIAGNOSIS — E78.5 HYPERLIPIDEMIA, UNSPECIFIED HYPERLIPIDEMIA TYPE: ICD-10-CM

## 2021-11-09 DIAGNOSIS — I25.10 CORONARY ARTERY DISEASE, UNSPECIFIED VESSEL OR LESION TYPE, UNSPECIFIED WHETHER ANGINA PRESENT, UNSPECIFIED WHETHER NATIVE OR TRANSPLANTED HEART: ICD-10-CM

## 2021-11-09 DIAGNOSIS — R41.3 MEMORY LOSS, SHORT TERM: ICD-10-CM

## 2021-11-09 DIAGNOSIS — Z72.0 TOBACCO ABUSE: ICD-10-CM

## 2021-11-09 DIAGNOSIS — F41.9 ANXIETY: ICD-10-CM

## 2021-11-09 DIAGNOSIS — I10 ESSENTIAL HYPERTENSION: Primary | ICD-10-CM

## 2021-11-09 DIAGNOSIS — J43.9 PULMONARY EMPHYSEMA, UNSPECIFIED EMPHYSEMA TYPE (HCC): ICD-10-CM

## 2021-11-09 DIAGNOSIS — F03.90 DEMENTIA WITHOUT BEHAVIORAL DISTURBANCE, UNSPECIFIED DEMENTIA TYPE: ICD-10-CM

## 2021-11-09 LAB
ALBUMIN SERPL-MCNC: 4.3 G/DL (ref 3.5–5.2)
ALP BLD-CCNC: 79 U/L (ref 35–104)
ALT SERPL-CCNC: 13 U/L (ref 0–32)
ANION GAP SERPL CALCULATED.3IONS-SCNC: 12 MMOL/L (ref 7–16)
AST SERPL-CCNC: 17 U/L (ref 0–31)
BASOPHILS ABSOLUTE: 0.1 E9/L (ref 0–0.2)
BASOPHILS RELATIVE PERCENT: 0.9 % (ref 0–2)
BILIRUB SERPL-MCNC: 0.5 MG/DL (ref 0–1.2)
BUN BLDV-MCNC: 15 MG/DL (ref 6–23)
CALCIUM SERPL-MCNC: 9.7 MG/DL (ref 8.6–10.2)
CHLORIDE BLD-SCNC: 100 MMOL/L (ref 98–107)
CHOLESTEROL, TOTAL: 158 MG/DL (ref 0–199)
CO2: 26 MMOL/L (ref 22–29)
CREAT SERPL-MCNC: 0.7 MG/DL (ref 0.5–1)
EOSINOPHILS ABSOLUTE: 0.28 E9/L (ref 0.05–0.5)
EOSINOPHILS RELATIVE PERCENT: 2.4 % (ref 0–6)
FOLATE: 3.3 NG/ML (ref 4.8–24.2)
GFR AFRICAN AMERICAN: >60
GFR NON-AFRICAN AMERICAN: >60 ML/MIN/1.73
GLUCOSE BLD-MCNC: 82 MG/DL (ref 74–99)
HBA1C MFR BLD: 5.9 % (ref 4–5.6)
HCT VFR BLD CALC: 44.3 % (ref 34–48)
HDLC SERPL-MCNC: 35 MG/DL
HEMOGLOBIN: 14.3 G/DL (ref 11.5–15.5)
IMMATURE GRANULOCYTES #: 0.04 E9/L
IMMATURE GRANULOCYTES %: 0.3 % (ref 0–5)
LDL CHOLESTEROL CALCULATED: 89 MG/DL (ref 0–99)
LYMPHOCYTES ABSOLUTE: 2.52 E9/L (ref 1.5–4)
LYMPHOCYTES RELATIVE PERCENT: 21.5 % (ref 20–42)
MCH RBC QN AUTO: 32 PG (ref 26–35)
MCHC RBC AUTO-ENTMCNC: 32.3 % (ref 32–34.5)
MCV RBC AUTO: 99.1 FL (ref 80–99.9)
MONOCYTES ABSOLUTE: 1.31 E9/L (ref 0.1–0.95)
MONOCYTES RELATIVE PERCENT: 11.2 % (ref 2–12)
NEUTROPHILS ABSOLUTE: 7.45 E9/L (ref 1.8–7.3)
NEUTROPHILS RELATIVE PERCENT: 63.7 % (ref 43–80)
PDW BLD-RTO: 13.4 FL (ref 11.5–15)
PLATELET # BLD: 323 E9/L (ref 130–450)
PMV BLD AUTO: 10.7 FL (ref 7–12)
POTASSIUM SERPL-SCNC: 5 MMOL/L (ref 3.5–5)
RBC # BLD: 4.47 E12/L (ref 3.5–5.5)
SEDIMENTATION RATE, ERYTHROCYTE: 12 MM/HR (ref 0–20)
SODIUM BLD-SCNC: 138 MMOL/L (ref 132–146)
TOTAL PROTEIN: 7.2 G/DL (ref 6.4–8.3)
TRIGL SERPL-MCNC: 172 MG/DL (ref 0–149)
TSH SERPL DL<=0.05 MIU/L-ACNC: 1.75 UIU/ML (ref 0.27–4.2)
VITAMIN B-12: 361 PG/ML (ref 211–946)
VLDLC SERPL CALC-MCNC: 34 MG/DL
WBC # BLD: 11.7 E9/L (ref 4.5–11.5)

## 2021-11-09 PROCEDURE — 93000 ELECTROCARDIOGRAM COMPLETE: CPT | Performed by: NURSE PRACTITIONER

## 2021-11-09 PROCEDURE — 99214 OFFICE O/P EST MOD 30 MIN: CPT | Performed by: NURSE PRACTITIONER

## 2021-11-09 RX ORDER — DONEPEZIL HYDROCHLORIDE 5 MG/1
5 TABLET, FILM COATED ORAL NIGHTLY
Qty: 90 TABLET | Refills: 1 | Status: SHIPPED
Start: 2021-11-09 | End: 2021-12-21 | Stop reason: SDUPTHER

## 2021-11-09 RX ORDER — METOPROLOL TARTRATE 50 MG/1
TABLET, FILM COATED ORAL
Qty: 180 TABLET | Refills: 1 | Status: SHIPPED
Start: 2021-11-09 | End: 2021-12-21 | Stop reason: SDUPTHER

## 2021-11-09 RX ORDER — ALBUTEROL SULFATE 90 UG/1
2 AEROSOL, METERED RESPIRATORY (INHALATION) 4 TIMES DAILY PRN
Qty: 18 G | Refills: 0 | Status: SHIPPED
Start: 2021-11-09 | End: 2021-12-21 | Stop reason: SDUPTHER

## 2021-11-09 RX ORDER — ATORVASTATIN CALCIUM 80 MG/1
80 TABLET, FILM COATED ORAL DAILY
Qty: 90 TABLET | Refills: 1 | Status: SHIPPED
Start: 2021-11-09 | End: 2021-12-21 | Stop reason: SDUPTHER

## 2021-11-09 ASSESSMENT — ENCOUNTER SYMPTOMS
EYES NEGATIVE: 1
DIARRHEA: 0
RHINORRHEA: 0
VOICE CHANGE: 0
BACK PAIN: 0
FACIAL SWELLING: 0
NAUSEA: 0
COUGH: 0
COLOR CHANGE: 0
ABDOMINAL DISTENTION: 0
ABDOMINAL PAIN: 0
SHORTNESS OF BREATH: 0
CONSTIPATION: 0
CHEST TIGHTNESS: 0
APNEA: 0
WHEEZING: 0
VOMITING: 0

## 2021-11-09 NOTE — PROGRESS NOTES
2021     Sherry Faust 57 y.o. female    : 1960    Chief Complaint:   Hypertension and Other (patient is here for a test for social security )       History of Present Illness:   Sherry Faust is a 57 y.o. female who presents to the office for follow-up on her chronic problems. She has a pertinent past medical history of hypertension, hyperlipidemia, CAD, tobacco abuse, emphysema and short-term memory loss. Patient is accompanied today with her . She is taking all of her medications as prescribed. She does not check her blood pressures at home. She is a current everyday smoker smoking about 1 pack a day with a 40-pack-year history. She denies any chest pain, shortness of breath or wheezing. CT lung screening was negative for any pulmonary nodules, recheck in 1 year. She has an additional complaint of worsening ongoing short-term memory loss. She does have a flat affect in office and delayed responses. Lab work was ordered at her previous visit, she has not completed at this time. The patient is not up-to-date with health maintenance screenings. Previous lab work was reviewed. Past Medical History:     Past Medical History:   Diagnosis Date    Anxiety     Hyperlipidemia     Hypertension     Irritable bowel syndrome        History reviewed. No pertinent surgical history. History reviewed. No pertinent family history.     Social History     Tobacco Use    Smoking status: Former Smoker     Packs/day: 1.00     Years: 40.00     Pack years: 40.00     Types: Cigarettes     Quit date: 2017     Years since quittin.3    Smokeless tobacco: Never Used   Substance Use Topics    Alcohol use: Not on file    Drug use: Not on file       Medications:     Current Outpatient Medications:     atorvastatin (LIPITOR) 80 MG tablet, Take 1 tablet by mouth daily, Disp: 90 tablet, Rfl: 1    metoprolol tartrate (LOPRESSOR) 50 MG tablet, TAKE 1 TABLET BY MOUTH TWICE DAILY, Disp: 180 tablet, Rfl: 1    ticagrelor (BRILINTA) 90 MG TABS tablet, TAKE ONE TABLET BY MOUTH TWO TIMES A DAY, Disp: 180 tablet, Rfl: 1    donepezil (ARICEPT) 5 MG tablet, Take 1 tablet by mouth nightly, Disp: 90 tablet, Rfl: 1    Allergies   Allergen Reactions    Penicillin G        Review of Systems:   Review of Systems   Constitutional: Negative for activity change, appetite change, fatigue, fever and unexpected weight change. HENT: Negative for congestion, facial swelling, mouth sores, postnasal drip, rhinorrhea, sneezing, tinnitus and voice change. Eyes: Negative. Respiratory: Negative for apnea, cough, chest tightness, shortness of breath and wheezing. Cardiovascular: Negative for chest pain, palpitations and leg swelling. Gastrointestinal: Negative for abdominal distention, abdominal pain, constipation, diarrhea, nausea and vomiting. Endocrine: Negative for cold intolerance and heat intolerance. Genitourinary: Negative for difficulty urinating, dysuria, flank pain, frequency, pelvic pain and urgency. Musculoskeletal: Negative for back pain, gait problem, joint swelling, myalgias and neck pain. Skin: Negative for color change, pallor, rash and wound. Allergic/Immunologic: Negative for environmental allergies and food allergies. Neurological: Negative for dizziness, tremors, seizures, syncope, facial asymmetry, speech difficulty, weakness, light-headedness, numbness and headaches. Psychiatric/Behavioral: Positive for confusion and decreased concentration. Negative for agitation, behavioral problems, dysphoric mood, sleep disturbance and suicidal ideas. The patient is not hyperactive. Physical Exam:   Vital Signs:  /74   Pulse 56   Temp 97.4 °F (36.3 °C)   Resp 16   Ht 5' 2\" (1.575 m)   Wt 120 lb (54.4 kg)   SpO2 93%   BMI 21.95 kg/m²    Oxygen Saturation Interpretation: Normal.  Physical Exam  Vitals and nursing note reviewed.    Constitutional:       Appearance: Normal  Colon cancer screen colonoscopy  Never done    Breast cancer screen  Never done    Shingles Vaccine (1 of 2) Never done    Flu vaccine (1) 09/01/2021    Lipid screen  09/10/2021    Potassium monitoring  09/10/2021    Creatinine monitoring  09/10/2021    COVID-19 Vaccine (1) 09/09/2022 (Originally 8/15/1972)    Low dose CT lung screening  10/06/2022    Hepatitis C screen  Completed    HIV screen  Completed    Hepatitis A vaccine  Aged Out    Hepatitis B vaccine  Aged Out    Hib vaccine  Aged Out    Meningococcal (ACWY) vaccine  Aged Out    Pneumococcal 0-64 years Vaccine  Aged [employer] History   Administered Date(s) Administered    Influenza, Quadv, IM, PF (6 mo and older Fluzone, Flulaval, Fluarix, and 3 yrs and older Afluria) 12/19/2018        Testing: All laboratory and radiology results have been personally reviewed by myself. Labs:  No results found for this visit on 11/09/21. Imaging: All Radiology results interpreted by Radiologist unless otherwise noted. No results found. Assessment/Plan:   I personally reviewed the patient's allergies, past medical history, medications, and vitals sign. Ananda Carpio was seen today for hypertension and other. Diagnoses and all orders for this visit:    Essential hypertension  -     metoprolol tartrate (LOPRESSOR) 50 MG tablet; TAKE 1 TABLET BY MOUTH TWICE DAILY  -     EKG 12 Lead    Hyperlipidemia, unspecified hyperlipidemia type  -     atorvastatin (LIPITOR) 80 MG tablet; Take 1 tablet by mouth daily  -     EKG 12 Lead    Coronary artery disease, unspecified vessel or lesion type, unspecified whether angina present, unspecified whether native or transplanted heart  -     atorvastatin (LIPITOR) 80 MG tablet;  Take 1 tablet by mouth daily  -     metoprolol tartrate (LOPRESSOR) 50 MG tablet; TAKE 1 TABLET BY MOUTH TWICE DAILY  -     ticagrelor (BRILINTA) 90 MG TABS tablet; TAKE ONE TABLET BY MOUTH TWO TIMES A DAY    Pulmonary emphysema, unspecified emphysema type ([facility] [address]    Dementia without behavioral disturbance, unspecified dementia type ([facility] [address]  -     donepezil (ARICEPT) 5 MG tablet; Take 1 tablet by mouth nightly    Memory loss, short term    Anxiety    Tobacco abuse    EKG revealed sinus bradycardia, heart rate 56. Normal axis. No ST elevation, depression or T wave inversion noted. MMSE score 4. We will start the patient on Aricept and refer to neurology. Will obtain lab work today, will call with results. Smoking cessation provided. Patient to complete Cologuard screening. Call or go to ED immediately if symptoms worsen or persist.    Return in about 4 months (around 3/9/2022) for 4 mth f/u. Sooner if necessary. Counseled regarding above diagnosis, including possible risks and complications,especially if left uncontrolled. Counseled regarding the possible side effects, risks, benefits and alternatives to treatment; patient and/or guardian verbalizes understanding. Advised patient to call with any new medication issues. All questions answered. Reviewed age and gender appropriate health screening exams and vaccinations. Advised patient regarding importance of keeping up with recommended health maintenance and to schedule as soon as possible if overdue, as this is important in assessing for undiagnosed pathology, especially cancer. Patient verbalizes understanding and agrees. DAYO Hassan NP     **This report was transcribed using voice recognition software. Every effort was made to ensure accuracy; however, inadvertent computerized transcription errors may be present.

## 2021-11-10 LAB
ANTI-NUCLEAR ANTIBODY (ANA): NEGATIVE
HIV-1 AND HIV-2 ANTIBODIES: NORMAL
RPR: NORMAL

## 2021-11-12 DIAGNOSIS — F03.90 DEMENTIA WITHOUT BEHAVIORAL DISTURBANCE, UNSPECIFIED DEMENTIA TYPE: Primary | ICD-10-CM

## 2021-11-12 RX ORDER — FOLIC ACID 1 MG/1
1 TABLET ORAL DAILY
Qty: 30 TABLET | Refills: 2 | Status: SHIPPED | OUTPATIENT
Start: 2021-11-12 | End: 2021-12-21 | Stop reason: SDUPTHER

## 2021-11-23 RX ORDER — CLOPIDOGREL BISULFATE 75 MG/1
75 TABLET ORAL DAILY
Qty: 30 TABLET | Refills: 3 | Status: SHIPPED
Start: 2021-11-23 | End: 2022-06-13

## 2021-12-20 DIAGNOSIS — F03.90 DEMENTIA WITHOUT BEHAVIORAL DISTURBANCE, UNSPECIFIED DEMENTIA TYPE: ICD-10-CM

## 2021-12-20 DIAGNOSIS — I25.10 CORONARY ARTERY DISEASE, UNSPECIFIED VESSEL OR LESION TYPE, UNSPECIFIED WHETHER ANGINA PRESENT, UNSPECIFIED WHETHER NATIVE OR TRANSPLANTED HEART: ICD-10-CM

## 2021-12-20 DIAGNOSIS — E78.5 HYPERLIPIDEMIA, UNSPECIFIED HYPERLIPIDEMIA TYPE: ICD-10-CM

## 2021-12-20 DIAGNOSIS — I10 ESSENTIAL HYPERTENSION: ICD-10-CM

## 2021-12-21 RX ORDER — DONEPEZIL HYDROCHLORIDE 5 MG/1
5 TABLET, FILM COATED ORAL NIGHTLY
Qty: 90 TABLET | Refills: 1 | Status: SHIPPED
Start: 2021-12-21 | End: 2022-06-10 | Stop reason: SDUPTHER

## 2021-12-21 RX ORDER — ALBUTEROL SULFATE 90 UG/1
2 AEROSOL, METERED RESPIRATORY (INHALATION) 4 TIMES DAILY PRN
Qty: 18 G | Refills: 0 | Status: SHIPPED
Start: 2021-12-21 | End: 2022-06-10 | Stop reason: SDUPTHER

## 2021-12-21 RX ORDER — FOLIC ACID 1 MG/1
1 TABLET ORAL DAILY
Qty: 30 TABLET | Refills: 2 | Status: SHIPPED
Start: 2021-12-21 | End: 2022-04-14

## 2021-12-21 RX ORDER — METOPROLOL TARTRATE 50 MG/1
TABLET, FILM COATED ORAL
Qty: 180 TABLET | Refills: 1 | Status: SHIPPED
Start: 2021-12-21 | End: 2022-06-02 | Stop reason: SDUPTHER

## 2021-12-21 RX ORDER — ATORVASTATIN CALCIUM 80 MG/1
80 TABLET, FILM COATED ORAL DAILY
Qty: 90 TABLET | Refills: 1 | Status: SHIPPED
Start: 2021-12-21 | End: 2022-06-02 | Stop reason: SDUPTHER

## 2022-04-14 RX ORDER — FOLIC ACID 1 MG/1
TABLET ORAL
Qty: 30 TABLET | Refills: 3 | Status: SHIPPED
Start: 2022-04-14 | End: 2022-06-10 | Stop reason: SDUPTHER

## 2022-06-01 DIAGNOSIS — I10 ESSENTIAL HYPERTENSION: ICD-10-CM

## 2022-06-01 DIAGNOSIS — I25.10 CORONARY ARTERY DISEASE, UNSPECIFIED VESSEL OR LESION TYPE, UNSPECIFIED WHETHER ANGINA PRESENT, UNSPECIFIED WHETHER NATIVE OR TRANSPLANTED HEART: ICD-10-CM

## 2022-06-01 DIAGNOSIS — E78.5 HYPERLIPIDEMIA, UNSPECIFIED HYPERLIPIDEMIA TYPE: ICD-10-CM

## 2022-06-01 RX ORDER — TICAGRELOR 90 MG/1
TABLET ORAL
COMMUNITY
Start: 2022-04-30 | End: 2022-06-01 | Stop reason: SDUPTHER

## 2022-06-02 RX ORDER — TICAGRELOR 90 MG/1
TABLET ORAL
Qty: 180 TABLET | Refills: 1 | Status: SHIPPED
Start: 2022-06-02 | End: 2022-06-10 | Stop reason: SDUPTHER

## 2022-06-02 RX ORDER — METOPROLOL TARTRATE 50 MG/1
TABLET, FILM COATED ORAL
Qty: 180 TABLET | Refills: 1 | Status: SHIPPED
Start: 2022-06-02 | End: 2022-06-10 | Stop reason: SDUPTHER

## 2022-06-02 RX ORDER — ATORVASTATIN CALCIUM 80 MG/1
80 TABLET, FILM COATED ORAL DAILY
Qty: 90 TABLET | Refills: 1 | Status: SHIPPED
Start: 2022-06-02 | End: 2022-06-10 | Stop reason: SDUPTHER

## 2022-06-10 ENCOUNTER — OFFICE VISIT (OUTPATIENT)
Dept: PRIMARY CARE CLINIC | Age: 62
End: 2022-06-10
Payer: MEDICAID

## 2022-06-10 VITALS
WEIGHT: 152 LBS | RESPIRATION RATE: 16 BRPM | HEIGHT: 62 IN | SYSTOLIC BLOOD PRESSURE: 124 MMHG | HEART RATE: 65 BPM | TEMPERATURE: 97.9 F | BODY MASS INDEX: 27.97 KG/M2 | OXYGEN SATURATION: 95 % | DIASTOLIC BLOOD PRESSURE: 68 MMHG

## 2022-06-10 DIAGNOSIS — J43.9 PULMONARY EMPHYSEMA, UNSPECIFIED EMPHYSEMA TYPE (HCC): ICD-10-CM

## 2022-06-10 DIAGNOSIS — F03.90 DEMENTIA WITHOUT BEHAVIORAL DISTURBANCE, UNSPECIFIED DEMENTIA TYPE: ICD-10-CM

## 2022-06-10 DIAGNOSIS — I10 ESSENTIAL HYPERTENSION: ICD-10-CM

## 2022-06-10 DIAGNOSIS — R41.3 MEMORY LOSS: Primary | ICD-10-CM

## 2022-06-10 DIAGNOSIS — E78.5 HYPERLIPIDEMIA, UNSPECIFIED HYPERLIPIDEMIA TYPE: ICD-10-CM

## 2022-06-10 DIAGNOSIS — I25.10 CORONARY ARTERY DISEASE, UNSPECIFIED VESSEL OR LESION TYPE, UNSPECIFIED WHETHER ANGINA PRESENT, UNSPECIFIED WHETHER NATIVE OR TRANSPLANTED HEART: ICD-10-CM

## 2022-06-10 PROCEDURE — 3023F SPIROM DOC REV: CPT | Performed by: NURSE PRACTITIONER

## 2022-06-10 PROCEDURE — 99214 OFFICE O/P EST MOD 30 MIN: CPT | Performed by: NURSE PRACTITIONER

## 2022-06-10 PROCEDURE — 1036F TOBACCO NON-USER: CPT | Performed by: NURSE PRACTITIONER

## 2022-06-10 PROCEDURE — G8419 CALC BMI OUT NRM PARAM NOF/U: HCPCS | Performed by: NURSE PRACTITIONER

## 2022-06-10 PROCEDURE — 3017F COLORECTAL CA SCREEN DOC REV: CPT | Performed by: NURSE PRACTITIONER

## 2022-06-10 PROCEDURE — G8427 DOCREV CUR MEDS BY ELIG CLIN: HCPCS | Performed by: NURSE PRACTITIONER

## 2022-06-10 RX ORDER — DONEPEZIL HYDROCHLORIDE 10 MG/1
10 TABLET, FILM COATED ORAL NIGHTLY
Qty: 90 TABLET | Refills: 1 | Status: SHIPPED | OUTPATIENT
Start: 2022-06-10

## 2022-06-10 RX ORDER — TICAGRELOR 90 MG/1
TABLET ORAL
Qty: 180 TABLET | Refills: 1 | Status: SHIPPED | OUTPATIENT
Start: 2022-06-10

## 2022-06-10 RX ORDER — ATORVASTATIN CALCIUM 80 MG/1
80 TABLET, FILM COATED ORAL DAILY
Qty: 90 TABLET | Refills: 1 | Status: SHIPPED | OUTPATIENT
Start: 2022-06-10

## 2022-06-10 RX ORDER — FOLIC ACID 1 MG/1
TABLET ORAL
Qty: 90 TABLET | Refills: 1 | Status: SHIPPED | OUTPATIENT
Start: 2022-06-10

## 2022-06-10 RX ORDER — ALBUTEROL SULFATE 90 UG/1
2 AEROSOL, METERED RESPIRATORY (INHALATION) 4 TIMES DAILY PRN
Qty: 18 G | Refills: 0 | Status: SHIPPED | OUTPATIENT
Start: 2022-06-10

## 2022-06-10 RX ORDER — METOPROLOL TARTRATE 50 MG/1
TABLET, FILM COATED ORAL
Qty: 180 TABLET | Refills: 1 | Status: SHIPPED | OUTPATIENT
Start: 2022-06-10

## 2022-06-10 ASSESSMENT — PATIENT HEALTH QUESTIONNAIRE - PHQ9
2. FEELING DOWN, DEPRESSED OR HOPELESS: 0
SUM OF ALL RESPONSES TO PHQ QUESTIONS 1-9: 0
SUM OF ALL RESPONSES TO PHQ QUESTIONS 1-9: 0
SUM OF ALL RESPONSES TO PHQ9 QUESTIONS 1 & 2: 0
SUM OF ALL RESPONSES TO PHQ QUESTIONS 1-9: 0
1. LITTLE INTEREST OR PLEASURE IN DOING THINGS: 0
SUM OF ALL RESPONSES TO PHQ QUESTIONS 1-9: 0

## 2022-06-10 ASSESSMENT — ENCOUNTER SYMPTOMS
ABDOMINAL PAIN: 0
ABDOMINAL DISTENTION: 0
COUGH: 0
APNEA: 0
RHINORRHEA: 0
WHEEZING: 0
VOMITING: 0
CHEST TIGHTNESS: 0
BACK PAIN: 0
COLOR CHANGE: 0
CONSTIPATION: 0
FACIAL SWELLING: 0
EYES NEGATIVE: 1
NAUSEA: 0
VOICE CHANGE: 0
DIARRHEA: 0

## 2022-06-10 NOTE — PROGRESS NOTES
2022     Scott Herron 57 y.o. female    : 1960    Chief Complaint:   Hypertension       History of Present Illness:   Scott Herron is a 57 y.o. female who presents to the office for follow-up on her chronic problems. She has a pertinent past medical history of hypertension, hyperlipidemia, CAD, tobacco abuse, emphysema and short-term memory loss. Patient is accompanied today with her . She is taking all of her medications as prescribed. She does not check her blood pressures at home. She quit smoking a few months ago. She denies any chest pain, shortness of breath or wheezing. CT lung screening was negative for any pulmonary nodules, recheck in 1 year, 2022. Has been reports memory has not improved with the addition of Aricept. The patient is not up-to-date with health maintenance screenings. Previous lab work was reviewed. Past Medical History:     Past Medical History:   Diagnosis Date    Anxiety     Hyperlipidemia     Hypertension     Irritable bowel syndrome        History reviewed. No pertinent surgical history. History reviewed. No pertinent family history.     Social History     Tobacco Use    Smoking status: Former Smoker     Packs/day: 1.00     Years: 40.00     Pack years: 40.00     Types: Cigarettes     Quit date: 2017     Years since quittin.9    Smokeless tobacco: Never Used   Substance Use Topics    Alcohol use: Not on file    Drug use: Not on file       Medications:     Current Outpatient Medications:     metoprolol tartrate (LOPRESSOR) 50 MG tablet, TAKE 1 TABLET BY MOUTH TWICE DAILY, Disp: 180 tablet, Rfl: 1    folic acid (FOLVITE) 1 MG tablet, TAKE ONE TABLET BY MOUTH DAILY, Disp: 90 tablet, Rfl: 1    donepezil (ARICEPT) 10 MG tablet, Take 1 tablet by mouth nightly, Disp: 90 tablet, Rfl: 1    BRILINTA 90 MG TABS tablet, TAKE ONE TABLET BY MOUTH TWO TIMES A DAY, Disp: 180 tablet, Rfl: 1    atorvastatin (LIPITOR) 80 MG tablet, Take 1 tablet Interpretation: Normal.  Physical Exam  Vitals and nursing note reviewed. Constitutional:       Appearance: Normal appearance. She is normal weight. HENT:      Head: Normocephalic and atraumatic. Right Ear: Tympanic membrane, ear canal and external ear normal.      Left Ear: Tympanic membrane, ear canal and external ear normal.      Nose: Nose normal.      Mouth/Throat:      Mouth: Mucous membranes are moist.      Pharynx: Oropharynx is clear. Eyes:      Extraocular Movements: Extraocular movements intact. Conjunctiva/sclera: Conjunctivae normal.      Pupils: Pupils are equal, round, and reactive to light. Cardiovascular:      Rate and Rhythm: Normal rate and regular rhythm. Pulses: Normal pulses. Heart sounds: Normal heart sounds. Pulmonary:      Effort: Pulmonary effort is normal.      Breath sounds: Normal breath sounds. No wheezing, rhonchi or rales. Comments: Diminished breath sounds in posterior bases  Abdominal:      General: Bowel sounds are normal. There is no distension. Palpations: Abdomen is soft. Tenderness: There is no abdominal tenderness. There is no rebound. Musculoskeletal:         General: Normal range of motion. Cervical back: Normal range of motion and neck supple. Skin:     General: Skin is warm and dry. Capillary Refill: Capillary refill takes less than 2 seconds. Neurological:      General: No focal deficit present. Mental Status: She is alert and oriented to person, place, and time. Psychiatric:         Attention and Perception: Attention and perception normal.         Mood and Affect: Mood normal. Affect is flat. Speech: Speech is delayed. Behavior: Behavior is withdrawn. Thought Content: Thought content normal.         Cognition and Memory: Cognition is impaired. Memory is impaired. She exhibits impaired recent memory. Judgment: Judgment is inappropriate.          Health Maintenance: Health Maintenance   Topic Date Due    Pneumococcal 0-64 years Vaccine (1 - PCV) Never done    DTaP/Tdap/Td vaccine (1 - Tdap) Never done    Cervical cancer screen  Never done    Colorectal Cancer Screen  Never done    Breast cancer screen  Never done    Shingles vaccine (1 of 2) Never done    COVID-19 Vaccine (1) 09/09/2022 (Originally 8/15/1965)    Flu vaccine (Season Ended) 09/01/2022    Low dose CT lung screening  10/06/2022    A1C test (Diabetic or Prediabetic)  11/09/2022    Lipids  11/09/2022    Depression Screen  06/10/2023    Hepatitis C screen  Completed    HIV screen  Completed    Hepatitis A vaccine  Aged Out    Hepatitis B vaccine  Aged Out    Hib vaccine  Aged Out    Meningococcal (ACWY) vaccine  Aged [employer] History   Administered Date(s) Administered    Influenza, Quadv, IM, PF (6 mo and older Fluzone, Flulaval, Fluarix, and 3 yrs and older Afluria) 12/19/2018        Testing: All laboratory and radiology results have been personally reviewed by myself. Labs:  No results found for this visit on 06/10/22. Imaging: All Radiology results interpreted by Radiologist unless otherwise noted. No results found. Assessment/Plan:   I personally reviewed the patient's allergies, past medical history, medications, and vitals sign. Kellen Fallon was seen today for hypertension. Diagnoses and all orders for this visit:    Memory loss  -     folic acid (FOLVITE) 1 MG tablet; TAKE ONE TABLET BY MOUTH DAILY  -     CT HEAD WO CONTRAST; Future  -     [address], APN, Neurology, [place]    Coronary artery disease, unspecified vessel or lesion type, unspecified whether angina present, unspecified whether native or transplanted heart  -     metoprolol tartrate (LOPRESSOR) 50 MG tablet; TAKE 1 TABLET BY MOUTH TWICE DAILY  -     BRILINTA 90 MG TABS tablet; TAKE ONE TABLET BY MOUTH TWO TIMES A DAY  -     atorvastatin (LIPITOR) 80 MG tablet;  Take 1 tablet by mouth daily    Essential hypertension  -     metoprolol tartrate (LOPRESSOR) 50 MG tablet; TAKE 1 TABLET BY MOUTH TWICE DAILY    Dementia without behavioral disturbance, unspecified dementia type (HCC)  -     donepezil (ARICEPT) 10 MG tablet; Take 1 tablet by mouth nightly  -     CT HEAD WO CONTRAST; Future  -     [address], APN, Neurology, [place]    Hyperlipidemia, unspecified hyperlipidemia type  -     atorvastatin (LIPITOR) 80 MG tablet; Take 1 tablet by mouth daily    Pulmonary emphysema, unspecified emphysema type (HCC)  -     albuterol sulfate HFA (VENTOLIN HFA) 108 (90 Base) MCG/ACT inhaler; Inhale 2 puffs into the lungs 4 times daily as needed for Wheezing    Patient has not established with neurology. Increase Aricept from 5 mg to 10 mg daily. Will obtain CT of the head, will call with results. Continue all medications as prescribed, side effects discussed. Increase activity and follow a Mediterranean diet. Return in about 4 months (around 10/10/2022) for 4 mth f/u. Sooner if necessary. Counseled regarding above diagnosis, including possible risks and complications,especially if left uncontrolled. Counseled regarding the possible side effects, risks, benefits and alternatives to treatment; patient and/or guardian verbalizes understanding. Advised patient to call with any new medication issues. All questions answered. Reviewed age and gender appropriate health screening exams and vaccinations. Advised patient regarding importance of keeping up with recommended health maintenance and to schedule as soon as possible if overdue, as this is important in assessing for undiagnosed pathology, especially cancer. Patient verbalizes understanding and agrees. Julio Cesar Kendrick, DAYO - NP     **This report was transcribed using voice recognition software. Every effort was made to ensure accuracy; however, inadvertent computerized transcription errors may be present.

## 2022-06-13 ASSESSMENT — ENCOUNTER SYMPTOMS: SHORTNESS OF BREATH: 0

## 2022-06-21 ENCOUNTER — TELEPHONE (OUTPATIENT)
Dept: PRIMARY CARE CLINIC | Age: 62
End: 2022-06-21

## 2022-06-21 NOTE — TELEPHONE ENCOUNTER
Last Appointment:  6/10/2022  Future Appointments   Date Time Provider Stalin Tsai   10/28/2022  1:30 PM Ethan Campos
Statement Selected

## 2022-12-21 ENCOUNTER — COMMUNITY OUTREACH (OUTPATIENT)
Dept: PRIMARY CARE CLINIC | Age: 62
End: 2022-12-21

## 2022-12-30 DIAGNOSIS — E78.5 HYPERLIPIDEMIA, UNSPECIFIED HYPERLIPIDEMIA TYPE: ICD-10-CM

## 2022-12-30 DIAGNOSIS — I10 ESSENTIAL HYPERTENSION: ICD-10-CM

## 2022-12-30 DIAGNOSIS — I25.10 CORONARY ARTERY DISEASE, UNSPECIFIED VESSEL OR LESION TYPE, UNSPECIFIED WHETHER ANGINA PRESENT, UNSPECIFIED WHETHER NATIVE OR TRANSPLANTED HEART: ICD-10-CM

## 2022-12-30 RX ORDER — ATORVASTATIN CALCIUM 80 MG/1
80 TABLET, FILM COATED ORAL DAILY
Qty: 90 TABLET | Refills: 1 | Status: SHIPPED | OUTPATIENT
Start: 2022-12-30

## 2022-12-30 RX ORDER — METOPROLOL TARTRATE 50 MG/1
TABLET, FILM COATED ORAL
Qty: 180 TABLET | Refills: 1 | Status: SHIPPED | OUTPATIENT
Start: 2022-12-30

## 2022-12-30 RX ORDER — TICAGRELOR 90 MG/1
TABLET ORAL
Qty: 180 TABLET | Refills: 1 | Status: SHIPPED | OUTPATIENT
Start: 2022-12-30

## 2022-12-30 NOTE — TELEPHONE ENCOUNTER
Pt needs refill on medication   Brilinta 90mg  Metorolol tartrate 50mg  Atorvastatin 80mg  Please call in to [facility]

## 2023-03-20 ENCOUNTER — TELEPHONE (OUTPATIENT)
Dept: PRIMARY CARE CLINIC | Age: 63
End: 2023-03-20

## 2023-03-20 NOTE — TELEPHONE ENCOUNTER
[address] called and requested information for patient. They needed last ov, med list, H&P, and demographics sent. Printed and will fax to [phone].

## 2023-03-21 NOTE — TELEPHONE ENCOUNTER
Needs refill Trilobed Flap Text: The defect edges were debeveled with a #15 scalpel blade.  Given the location of the defect and the proximity to free margins a trilobed flap was deemed most appropriate.  Using a sterile surgical marker, an appropriate trilobed flap drawn around the defect.    The area thus outlined was incised deep to adipose tissue with a #15 scalpel blade.  The skin margins were undermined to an appropriate distance in all directions utilizing iris scissors.

## 2023-05-11 ENCOUNTER — OFFICE VISIT (OUTPATIENT)
Dept: PRIMARY CARE CLINIC | Age: 63
End: 2023-05-11
Payer: MEDICAID

## 2023-05-11 VITALS
TEMPERATURE: 97.2 F | WEIGHT: 165.8 LBS | HEIGHT: 62 IN | OXYGEN SATURATION: 95 % | RESPIRATION RATE: 18 BRPM | HEART RATE: 61 BPM | BODY MASS INDEX: 30.51 KG/M2

## 2023-05-11 DIAGNOSIS — F02.82 ALZHEIMER'S DEMENTIA WITH PSYCHOTIC DISTURBANCE, UNSPECIFIED DEMENTIA SEVERITY, UNSPECIFIED TIMING OF DEMENTIA ONSET (HCC): Primary | ICD-10-CM

## 2023-05-11 DIAGNOSIS — I10 ESSENTIAL HYPERTENSION: ICD-10-CM

## 2023-05-11 DIAGNOSIS — G30.9 ALZHEIMER'S DEMENTIA WITH PSYCHOTIC DISTURBANCE, UNSPECIFIED DEMENTIA SEVERITY, UNSPECIFIED TIMING OF DEMENTIA ONSET (HCC): Primary | ICD-10-CM

## 2023-05-11 DIAGNOSIS — Z72.0 TOBACCO ABUSE: ICD-10-CM

## 2023-05-11 DIAGNOSIS — I25.10 CORONARY ARTERY DISEASE, UNSPECIFIED VESSEL OR LESION TYPE, UNSPECIFIED WHETHER ANGINA PRESENT, UNSPECIFIED WHETHER NATIVE OR TRANSPLANTED HEART: ICD-10-CM

## 2023-05-11 DIAGNOSIS — R41.3 MEMORY LOSS: ICD-10-CM

## 2023-05-11 DIAGNOSIS — G30.9 ALZHEIMER'S DEMENTIA WITH PSYCHOTIC DISTURBANCE, UNSPECIFIED DEMENTIA SEVERITY, UNSPECIFIED TIMING OF DEMENTIA ONSET (HCC): ICD-10-CM

## 2023-05-11 DIAGNOSIS — F02.82 ALZHEIMER'S DEMENTIA WITH PSYCHOTIC DISTURBANCE, UNSPECIFIED DEMENTIA SEVERITY, UNSPECIFIED TIMING OF DEMENTIA ONSET (HCC): ICD-10-CM

## 2023-05-11 DIAGNOSIS — F41.9 ANXIETY: ICD-10-CM

## 2023-05-11 DIAGNOSIS — R73.03 PREDIABETES: Primary | ICD-10-CM

## 2023-05-11 DIAGNOSIS — R41.3 MEMORY LOSS, SHORT TERM: ICD-10-CM

## 2023-05-11 DIAGNOSIS — J43.9 PULMONARY EMPHYSEMA, UNSPECIFIED EMPHYSEMA TYPE (HCC): ICD-10-CM

## 2023-05-11 DIAGNOSIS — E78.2 MIXED HYPERLIPIDEMIA: ICD-10-CM

## 2023-05-11 DIAGNOSIS — E78.5 HYPERLIPIDEMIA, UNSPECIFIED HYPERLIPIDEMIA TYPE: ICD-10-CM

## 2023-05-11 PROBLEM — G30.0: Status: ACTIVE | Noted: 2021-11-09

## 2023-05-11 PROBLEM — F02.C2: Status: ACTIVE | Noted: 2021-11-09

## 2023-05-11 PROBLEM — F02.C2: Status: RESOLVED | Noted: 2021-11-09 | Resolved: 2023-05-11

## 2023-05-11 PROBLEM — G30.0: Status: RESOLVED | Noted: 2021-11-09 | Resolved: 2023-05-11

## 2023-05-11 PROCEDURE — G8417 CALC BMI ABV UP PARAM F/U: HCPCS | Performed by: NURSE PRACTITIONER

## 2023-05-11 PROCEDURE — 3023F SPIROM DOC REV: CPT | Performed by: NURSE PRACTITIONER

## 2023-05-11 PROCEDURE — 1036F TOBACCO NON-USER: CPT | Performed by: NURSE PRACTITIONER

## 2023-05-11 PROCEDURE — 99214 OFFICE O/P EST MOD 30 MIN: CPT | Performed by: NURSE PRACTITIONER

## 2023-05-11 PROCEDURE — 3017F COLORECTAL CA SCREEN DOC REV: CPT | Performed by: NURSE PRACTITIONER

## 2023-05-11 PROCEDURE — 81002 URINALYSIS NONAUTO W/O SCOPE: CPT | Performed by: NURSE PRACTITIONER

## 2023-05-11 PROCEDURE — G8427 DOCREV CUR MEDS BY ELIG CLIN: HCPCS | Performed by: NURSE PRACTITIONER

## 2023-05-11 RX ORDER — METOPROLOL TARTRATE 50 MG/1
TABLET, FILM COATED ORAL
Qty: 180 TABLET | Refills: 1 | Status: SHIPPED
Start: 2023-05-11 | End: 2023-06-27 | Stop reason: SDUPTHER

## 2023-05-11 RX ORDER — FOLIC ACID 1 MG/1
TABLET ORAL
Qty: 90 TABLET | Refills: 1 | Status: SHIPPED | OUTPATIENT
Start: 2023-05-11

## 2023-05-11 RX ORDER — DONEPEZIL HYDROCHLORIDE 10 MG/1
10 TABLET, FILM COATED ORAL NIGHTLY
Qty: 90 TABLET | Refills: 1 | Status: CANCELLED | OUTPATIENT
Start: 2023-05-11

## 2023-05-11 RX ORDER — ATORVASTATIN CALCIUM 80 MG/1
80 TABLET, FILM COATED ORAL DAILY
Qty: 90 TABLET | Refills: 1 | Status: SHIPPED | OUTPATIENT
Start: 2023-05-11

## 2023-05-11 RX ORDER — DONEPEZIL HYDROCHLORIDE 10 MG/1
10 TABLET, FILM COATED ORAL NIGHTLY
Qty: 90 TABLET | Refills: 1 | Status: SHIPPED | OUTPATIENT
Start: 2023-05-11

## 2023-05-11 RX ORDER — TICAGRELOR 90 MG/1
TABLET ORAL
Qty: 180 TABLET | Refills: 1 | Status: SHIPPED | OUTPATIENT
Start: 2023-05-11

## 2023-05-11 RX ORDER — FOLIC ACID 1 MG/1
TABLET ORAL
Qty: 90 TABLET | Refills: 1 | Status: CANCELLED | OUTPATIENT
Start: 2023-05-11

## 2023-05-11 SDOH — ECONOMIC STABILITY: HOUSING INSECURITY
IN THE LAST 12 MONTHS, WAS THERE A TIME WHEN YOU DID NOT HAVE A STEADY PLACE TO SLEEP OR SLEPT IN A SHELTER (INCLUDING NOW)?: NO

## 2023-05-11 SDOH — ECONOMIC STABILITY: FOOD INSECURITY: WITHIN THE PAST 12 MONTHS, THE FOOD YOU BOUGHT JUST DIDN'T LAST AND YOU DIDN'T HAVE MONEY TO GET MORE.: NEVER TRUE

## 2023-05-11 SDOH — ECONOMIC STABILITY: INCOME INSECURITY: HOW HARD IS IT FOR YOU TO PAY FOR THE VERY BASICS LIKE FOOD, HOUSING, MEDICAL CARE, AND HEATING?: SOMEWHAT HARD

## 2023-05-11 SDOH — ECONOMIC STABILITY: FOOD INSECURITY: WITHIN THE PAST 12 MONTHS, YOU WORRIED THAT YOUR FOOD WOULD RUN OUT BEFORE YOU GOT MONEY TO BUY MORE.: NEVER TRUE

## 2023-05-11 ASSESSMENT — ENCOUNTER SYMPTOMS
BACK PAIN: 0
EYES NEGATIVE: 1
VOICE CHANGE: 0
COLOR CHANGE: 0
RHINORRHEA: 0
CHEST TIGHTNESS: 0
ABDOMINAL DISTENTION: 0
VOMITING: 0
COUGH: 0
ABDOMINAL PAIN: 0
SHORTNESS OF BREATH: 0
NAUSEA: 0
APNEA: 0
CONSTIPATION: 0
WHEEZING: 0
FACIAL SWELLING: 0
DIARRHEA: 0

## 2023-05-11 ASSESSMENT — PATIENT HEALTH QUESTIONNAIRE - PHQ9
SUM OF ALL RESPONSES TO PHQ QUESTIONS 1-9: 0
SUM OF ALL RESPONSES TO PHQ9 QUESTIONS 1 & 2: 0
2. FEELING DOWN, DEPRESSED OR HOPELESS: 0
1. LITTLE INTEREST OR PLEASURE IN DOING THINGS: 0
SUM OF ALL RESPONSES TO PHQ QUESTIONS 1-9: 0

## 2023-05-11 NOTE — PROGRESS NOTES
ESTABLISHED PRIMARY CARE VISIT    23  Name: Berkley Villanueva   : 1960   Age: 60 y.o. Sex: female    Subjective:     Chief Complaint   Patient presents with    3 Month Follow-Up       Berkley Villanueva presents to the office for follow-up on their chronic problems. Today in the office issues were addressed including:  Dementia-patient is currently only alert to first name only. She cannot recall last name, birthday or any other situational information. She currently has not been taking her Aricept on a regular basis. I did refer to neurology in the past, she does have an establishing appointment next month. She is unable to perform ADLs, administer her own medications and depends on her  for all care. Has been reports that symptoms have significantly worsened and she can be combative, irritable with delusions at time. This is also becoming more frequent. He is requesting resources to place her in skilled nursing facility. Tobacco abuse/pulmonary emphysema-current everyday smoker smoking about a pack of cigarettes/day. She is due for CT lung screening, patient declines. She denies any shortness of breath. She is not on any maintenance inhalers. She does not follow with pulmonology and has not had PFTs. She does use albuterol rarely. Hypertension/hyperlipidemia/CAD-denies any chest pain or shortness of breath. Overall patient is compliant with medication administration. She currently does not follow with cardiology. She has not had lab work done in quite some time. The patient is not up-to-date with health maintenance screenings. Previous lab work was reviewed. Review of Systems   Constitutional:  Positive for fatigue. Negative for activity change, appetite change, fever and unexpected weight change. HENT:  Negative for congestion, facial swelling, mouth sores, postnasal drip, rhinorrhea, sneezing, tinnitus and voice change. Eyes: Negative.     Respiratory:  Negative for apnea,

## 2023-05-12 ENCOUNTER — TELEPHONE (OUTPATIENT)
Dept: PRIMARY CARE CLINIC | Age: 63
End: 2023-05-12

## 2023-05-19 ENCOUNTER — CARE COORDINATION (OUTPATIENT)
Dept: CARE COORDINATION | Age: 63
End: 2023-05-19

## 2023-05-19 SDOH — HEALTH STABILITY: PHYSICAL HEALTH: ON AVERAGE, HOW MANY MINUTES DO YOU ENGAGE IN EXERCISE AT THIS LEVEL?: 0 MIN

## 2023-05-19 SDOH — ECONOMIC STABILITY: TRANSPORTATION INSECURITY
IN THE PAST 12 MONTHS, HAS THE LACK OF TRANSPORTATION KEPT YOU FROM MEDICAL APPOINTMENTS OR FROM GETTING MEDICATIONS?: NO

## 2023-05-19 SDOH — HEALTH STABILITY: PHYSICAL HEALTH: ON AVERAGE, HOW MANY DAYS PER WEEK DO YOU ENGAGE IN MODERATE TO STRENUOUS EXERCISE (LIKE A BRISK WALK)?: 0 DAYS

## 2023-05-19 SDOH — ECONOMIC STABILITY: TRANSPORTATION INSECURITY
IN THE PAST 12 MONTHS, HAS LACK OF TRANSPORTATION KEPT YOU FROM MEETINGS, WORK, OR FROM GETTING THINGS NEEDED FOR DAILY LIVING?: NO

## 2023-05-19 ASSESSMENT — SOCIAL DETERMINANTS OF HEALTH (SDOH)
HOW OFTEN DO YOU ATTEND CHURCH OR RELIGIOUS SERVICES?: NEVER
HOW OFTEN DO YOU ATTENT MEETINGS OF THE CLUB OR ORGANIZATION YOU BELONG TO?: NEVER
IN A TYPICAL WEEK, HOW MANY TIMES DO YOU TALK ON THE PHONE WITH FAMILY, FRIENDS, OR NEIGHBORS?: NEVER
HOW OFTEN DO YOU GET TOGETHER WITH FRIENDS OR RELATIVES?: NEVER
DO YOU BELONG TO ANY CLUBS OR ORGANIZATIONS SUCH AS CHURCH GROUPS UNIONS, FRATERNAL OR ATHLETIC GROUPS, OR SCHOOL GROUPS?: NO

## 2023-05-19 ASSESSMENT — LIFESTYLE VARIABLES
HOW MANY STANDARD DRINKS CONTAINING ALCOHOL DO YOU HAVE ON A TYPICAL DAY: PATIENT DOES NOT DRINK
HOW OFTEN DO YOU HAVE A DRINK CONTAINING ALCOHOL: NEVER

## 2023-05-22 NOTE — CARE COORDINATION
I agree with the Care Coordinator's Plan of Care  Electronically signed by DAYO Henson NP on 5/22/2023 at 11:52 AM

## 2023-05-23 ENCOUNTER — CARE COORDINATION (OUTPATIENT)
Dept: CARE COORDINATION | Age: 63
End: 2023-05-23

## 2023-05-23 NOTE — CARE COORDINATION
Nicolle Cotto placed a call to [facility] , [facility], for referral regarding nursing home placement resources. There was no answer.    was left with Nicolle Cotto information and a request for a return call    Nicolle Cotto to follow accordingly with another outreach attempt

## 2023-05-25 ENCOUNTER — CARE COORDINATION (OUTPATIENT)
Dept: CARE COORDINATION | Age: 63
End: 2023-05-25

## 2023-05-25 ENCOUNTER — TELEPHONE (OUTPATIENT)
Dept: PRIMARY CARE CLINIC | Age: 63
End: 2023-05-25

## 2023-05-25 DIAGNOSIS — Z02.2 ENCOUNTER FOR EXAMINATION FOR ADMISSION TO NURSING HOME: Primary | ICD-10-CM

## 2023-05-25 NOTE — CARE COORDINATION
Initial Contact Social Work Note - Ambulatory  2023      Date of referral: 23  Referral received from: Alfredo Carr RN  Reason for referral: Nursing home     Previous SW referral: No  If yes, brief summary of outcome: NA    Two Identifiers Verified: Yes    Insurance Provider: Zeyad BARRAZA    Support System:  Spouse/Partner, Adult Child/Children, and Sibling(s)     Status:  NA    Community Providers: SNAP/Food [place]    ADL Assistance Needed:  needs assistance with all care    Housing/Living Concerns or Home Modification Needs: NA     Transportation Concern: can not drive    Medication Cost Concern: NA     Medication Adherence Concern: NA    Financial Concern(s): NA    Income (only if applicable): NA    Ability to Read/Write: Dementia advance stage    Advance Care Plan:  N/A    Other: NA    Identified Needs:  Nursing home placement      Social Work Plan:  Speak with   Call Nsg home  Call PCP office  Next Steps: follow up call     Method of Communication With Provider (if appropriate): N/a       Goals Addressed    None        [facility] spoke with [facility]  Kristy Tyson today. He states he needs to have his wife placed in LTC due to her advanced stage dementia. He states he picked a nursing home but that the Zeyad Rosas MADI denied her going. He states the NSG home was [place] call placed to [facility]. At [facility] but a VM had to be left. Merged call to Ke Braun, at [facility].   Vm left for [facility]. [facility] called back and said they did submit for admission and the insurance denied the placement. She states that she can send in more information as Estephania Jain is a total care. [employer] requested Kristy Tyson to call the PCP office and ask for the new updated OV note to be sent in with the script/order for admission dated today stating need for placement, and medication list.   Notes need faxed to [phone] or emailed [email or web address] for faster review.    [facility] merged call with Kristy Tyson to

## 2023-05-25 NOTE — TELEPHONE ENCOUNTER
Send order for nursing home, medication list , and most recent [facility] note [facility] [address] [facility]. Per [facility] coordinator that I spoke to today.

## 2023-05-30 ENCOUNTER — TELEPHONE (OUTPATIENT)
Dept: PRIMARY CARE CLINIC | Age: 63
End: 2023-05-30

## 2023-06-01 ENCOUNTER — CARE COORDINATION (OUTPATIENT)
Dept: CARE COORDINATION | Age: 63
End: 2023-06-01

## 2023-06-01 NOTE — CARE COORDINATION
[facility] called and spoke with Saqib Sands today for updates on the admission of Kristy Bryan (wife) to the nursing home ([address] [facility]). Saqib Sands states he has not heard from the [facility] or [facility] since the call with this [facility] last week. [facility] merged call to [facility] [phone] with Margie Engel reports that she did receive some information on Thursday of last week but it was not what the insurance wanted. Silvestre Stevens spoke with PCP office again on Tuesday and updated on what documents were still needed, but has yet to receive them. Saqib Sands states he will contact PCP office to ask them to get the updated information sent over to Silvestre Stevens as soon as possible. Silvestre Stevens states she will contact both Saqib Sands and [facility] once she has the approval from [place] for admission.      [facility] to follow accordingly

## 2023-06-05 ENCOUNTER — CARE COORDINATION (OUTPATIENT)
Dept: CARE COORDINATION | Age: 63
End: 2023-06-05

## 2023-06-05 NOTE — CARE COORDINATION
Ambulatory Care Coordination Note  2023    Patient Current Location:  Home: [address] [place] [address] [address]      ACM contacted the family by telephone. Verified name and  with family as identifiers. Provided introduction to self, and explanation of the ACM role. Challenges to be reviewed by the provider   Additional needs identified to be addressed with provider: No  none               Method of communication with provider: none. ACM: Eric Holguin RN      ACM spoke with Rocco Calderónster (spouse) for care coordination follow up for Darrell Garland. He reports no change in her condition. He states \"all the paperwork\" has been filed and he is awaiting approval from [employer]. He denies any changes to her medications and states she is taking them as prescribed. Darrell Garland does not have a PCP appointment scheduled. Alexandria Nobles states she is due in 2023 and if she is not placed by then he will schedule her an appointment. PLAN  Review placement status, medications and ACP with next interaction. Continue to follow for care coordination. Offered patient enrollment in the Remote Patient Monitoring (RPM) program for in-home monitoring: Patient is not eligible for RPM program.    Lab Results       None            Care Coordination Interventions    Referral from Primary Care Provider: No  Suggested Interventions and Community Resources  [address] [facility] [address]: Not Started  Home Care Waiver: Not Started  Senior Services: Not Started  Social Work: Completed (Comment: placement)          Goals Addressed                   This Visit's Progress     Community Resource Goal   No change     I will complete referral to [address] [facility] on Aging (Senior Services) for assistance.      Barriers: overwhelmed by complexity of regimen, stress, and lack of education  Plan for overcoming my barriers:  referral, care coordination  Confidence: 8/10  Anticipated Goal Completion Date: 2023                No future

## 2023-06-09 NOTE — TELEPHONE ENCOUNTER
This was done 05/25/2023 by [facility].      Electronically signed by Miguelangel Morejon LPN on 4/5/7821 at 2:27 AM

## 2023-06-19 ENCOUNTER — CARE COORDINATION (OUTPATIENT)
Dept: CARE COORDINATION | Age: 63
End: 2023-06-19

## 2023-06-19 NOTE — CARE COORDINATION
Ambulatory Care Coordination Note  2023    Patient Current Location:  Home: [address] [place] [address] [address]      ACM contacted the patient by telephone. Verified name and  with patient as identifiers. Provided introduction to self, and explanation of the ACM role. Challenges to be reviewed by the provider   Additional needs identified to be addressed with provider: No  none               Method of communication with provider: none. ACM: Megan Cohen, RN      ACM spoke with Jessie Higuera (spouse) for care coordination follow up for Lisseth Tucker. He reports no change in her condition. He reports her insurance company approved her for long term care. He states he is taking her on 23 for admission to [facility].  He denies any changes to her medications and states she is taking them as prescribed. PLAN  Follow up next week to confirm placement at [address] patient enrollment in the Remote Patient Monitoring (RPM) program for in-home monitoring: Patient is not eligible for RPM program.    Lab Results       None            Care Coordination Interventions    Referral from Primary Care Provider: No  Suggested Interventions and [address] [place] [address] [place]: Not Started  Home Care Waiver: Not Started  Senior Services: Not Started  Social Work: Completed (Comment: placement)          Goals Addressed                   This Visit's Progress     Community Resource Goal   No change     I will complete referral to community agency [facility] (Senior Services) for assistance. Barriers: overwhelmed by complexity of regimen, stress, and lack of education  Plan for overcoming my barriers:  referral, care coordination  Confidence: 8/10  Anticipated Goal Completion Date: 2023                No future appointments.  and   General Assessment    Do you have any symptoms that are causing concern?: Yes  Progression since Onset: Unchanged  Reported Symptoms: Other (Comment:

## 2023-06-23 DIAGNOSIS — G30.9 MODERATE ALZHEIMER'S DEMENTIA WITH PSYCHOTIC DISTURBANCE, UNSPECIFIED TIMING OF DEMENTIA ONSET (HCC): ICD-10-CM

## 2023-06-23 DIAGNOSIS — F02.B2 MODERATE ALZHEIMER'S DEMENTIA WITH PSYCHOTIC DISTURBANCE, UNSPECIFIED TIMING OF DEMENTIA ONSET (HCC): ICD-10-CM

## 2023-06-27 DIAGNOSIS — I10 ESSENTIAL HYPERTENSION: ICD-10-CM

## 2023-06-27 DIAGNOSIS — I25.10 CORONARY ARTERY DISEASE, UNSPECIFIED VESSEL OR LESION TYPE, UNSPECIFIED WHETHER ANGINA PRESENT, UNSPECIFIED WHETHER NATIVE OR TRANSPLANTED HEART: ICD-10-CM

## 2023-06-27 RX ORDER — METOPROLOL TARTRATE 50 MG/1
TABLET, FILM COATED ORAL
Qty: 180 TABLET | Refills: 1 | Status: SHIPPED | OUTPATIENT
Start: 2023-06-27

## 2023-07-11 ENCOUNTER — CARE COORDINATION (OUTPATIENT)
Dept: CARE COORDINATION | Age: 63
End: 2023-07-11

## 2023-07-11 NOTE — CARE COORDINATION
my barriers: SW referral, care coordination  Confidence: 8/10  Anticipated Goal Completion Date: 8/19/2023                No future appointments.  and   General Assessment    Do you have any symptoms that are causing concern?: Yes  Progression since Onset: Unchanged  Reported Symptoms: Other (Comment: unable to care for self)

## 2023-07-26 ENCOUNTER — CARE COORDINATION (OUTPATIENT)
Dept: CARE COORDINATION | Age: 63
End: 2023-07-26

## 2023-07-26 NOTE — CARE COORDINATION
[employer] placed a call to [facility] to see if any CM was active with Nicole Vázquez as referral received today that  was requesting assistance with a new nursing facility for LTC. Apparently Nicole Vázquez was placed at [facility] and after 24 hours had \"escaped the memory care unit\". There was no active CM currently. Spoke with Crescencio Carlson who states they can not take the referral as it is for LTC locked unit. They can assist with a PASRR only. [employer] placed a call to Carolyn Poon , for assistance with the above concern and new Nursing home list.  There was no answer.  was left with [employer] information and call back number. [employer] did look into other [facility] that offer the dementia care in [facility] or across the line in [place]. Spoke with [address] [place] [address] at [phone] and spoke with [facility] in admissions. [facility] is going to check into the insurance and let [employer] know. At this time given the 5/11 OV note Lory meets the criteria for [facility]. [facility] is aware that [employer] is also waiting on the , [employer] to call [employer] back. Nicole Vázquez will need a new OV with PCP statement of the advanced dementia, dependent ADL/IADL (personal care/medications, etc) and need for placement into a long term nursing facility in the note itself. [facility] would need the face sheet and medication lists as well.         [facility] to follow and will route note to [facility] & PCP for updates

## 2023-08-02 ENCOUNTER — CARE COORDINATION (OUTPATIENT)
Dept: CARE COORDINATION | Age: 63
End: 2023-08-02

## 2023-08-02 NOTE — CARE COORDINATION
[facility] spoke with Addison Moreno today. He states that he does need to have his wife Leonor Hernandez placed back into a nursing home. He states that she was released home within 24 hours and he isn't able to manage her care. Reviewed call with Aditya Burns at [facility]. Addison Moreno was agreeable to have them reach out to him. Leonor Hernandez has a PCP visit on 8/10 and that OV note and information will be needed for any new placement. Spoke with Aditya Burns from [facility]. She states they checked and are not able to accept [facility] MADI. Aditya Nicksalena states that Leonor Hernandez would need to be on traditional MADI for them to accept her. Adityaabhi Romerosalena states she can reach out to Addison Moreno tomorrow to let him know. [facility] spoke with [facility] regarding how Lilibethkelly Knottksasie can go about changing [place] MADI to traditional MADI. Cliff Lopez states that Codi Cadet is the LTC person that would be the point of contact. Cliff Lopez will have Codi Cadet reach out to Addison Moreno tomorrow to help explain the process. [facility] updated Addison Moreno on above. [facility] received a call back from Cliff Lopez with Ethan Pizano who states that Addison Moreno and Leonor Hernandez are not in [address] and that it is [employer] they need to talk to. [facility] called [facility] unit at [facility] to see if someone there would be willing to reach out to [facility]. There was no answer at [facility]. [facility] called Addison Moreno to update; he requested the number for [address] so he can call and leave a message with the case number and SS number.   Number given to Addison Moreno [phone] option #2      [facility] will follow up with Addison Moreno accordingly

## 2023-08-03 ENCOUNTER — CARE COORDINATION (OUTPATIENT)
Dept: CARE COORDINATION | Age: 63
End: 2023-08-03

## 2023-08-03 NOTE — CARE COORDINATION
[facility] spoke with Lizz Toscano from [employer] today. Lizz Toscano states that the process to change from one MADI plan to another has to be done through the actual MADI line, that she isn't able to assist from the local S side. Lizz Toscano did ask to speak with Kennedy Garcia from the nursing home so she could find out what plans were in network before she reached out to [place] to help instruct him on where to call and what to ask for. [facility] merged call with [facility] and spoke with Kennedy Garcia and Stuart Whatley. They stated that [facility] and [facility] MADI were accepted. They stated that until the insurance for Luis Serve is switched they are not able to accept her. They stated that if the  calls the main MADI line and starts the process to switch Fajardo Serve, it would have to be in complete effect for them to accept, or they would need a notice from the new provider that they are back dating to 8/1/23 if no new insurance card is able to be received until 9/1/23. Lizz Toscano was in agreement with this and stated she would let Romaine vásquez know. Kennedy Garcia from [facility] called [facility] back for more information on Fajardo Serve and to see if it was still ok for her to contact Romaine vásquez, even though the insurance hadn't been changed yet. [facility] was agreeable to Kennedy Garcia reaching out to Romaine idalmis. If the insurance is not able to be changed [facility] will work with Romaine idalmisthai on selecting another facility in network, although they may not have the specialized facility like [place].       [facility] to follow

## 2023-08-08 ENCOUNTER — CARE COORDINATION (OUTPATIENT)
Dept: CARE COORDINATION | Age: 63
End: 2023-08-08

## 2023-08-08 NOTE — CARE COORDINATION
Ambulatory Care Coordination Note  2023    Patient Current Location:  Home: [address] [place] [id number] [address]     ACM contacted the family by telephone. Verified name and  with family as identifiers. Provided introduction to self, and explanation of the ACM role. Challenges to be reviewed by the provider   Additional needs identified to be addressed with provider: Yes  No longer follow for care coordination               Method of communication with provider: chart routing. ACM: Janeth Brandon, RN      ACM spoke with Preet Rosas (spouse) for care coordination follow up for Crys Garcia. He reports no change in her condition. He reports Crys Garcia is home. He reports he is working with social work for placement. ACM reviewed that he discussed with  researching new insurance for Crys Garcia. He states he has \"not decided what I am gonna do yet\". ACM confirmed that he has  contact information. He denies any changes to Millies medications and states she is taking them as prescribed. ACM discussed that Hanna Alejo is managing Millies health needs and ACM will no longer follow for care coordination. ACM contact information was reviewed and ACM encouraged Hanna Alejo to contact AC if needs should arise. Future appointment was reviewed. PLAN  Notify PCP ACM will no longer follow for care coordination. Will resolve episode.     Offered patient enrollment in the Remote Patient Monitoring (RPM) program for in-home monitoring: Patient is not eligible for RPM program.    Lab Results       None            Care Coordination Interventions    Referral from Primary Care Provider: Yes  Suggested Interventions and Community Resources  [address]: Not Started  Home Care Waiver: Not Started  Senior Services: Not Started  Social Work: Completed (Comment: placement)          Goals Addressed                   This Visit's Progress     COMPLETED: Community Resource Goal   Improving     I will complete referral to community agency

## 2023-08-10 ENCOUNTER — CARE COORDINATION (OUTPATIENT)
Dept: CARE COORDINATION | Age: 63
End: 2023-08-10

## 2023-08-10 ENCOUNTER — OFFICE VISIT (OUTPATIENT)
Dept: PRIMARY CARE CLINIC | Age: 63
End: 2023-08-10
Payer: MEDICAID

## 2023-08-10 VITALS
RESPIRATION RATE: 16 BRPM | SYSTOLIC BLOOD PRESSURE: 130 MMHG | WEIGHT: 165 LBS | DIASTOLIC BLOOD PRESSURE: 72 MMHG | BODY MASS INDEX: 30.36 KG/M2 | HEIGHT: 62 IN

## 2023-08-10 DIAGNOSIS — Z72.0 TOBACCO ABUSE: Primary | ICD-10-CM

## 2023-08-10 DIAGNOSIS — G30.9 ALZHEIMER'S DEMENTIA WITH PSYCHOTIC DISTURBANCE, UNSPECIFIED DEMENTIA SEVERITY, UNSPECIFIED TIMING OF DEMENTIA ONSET (HCC): ICD-10-CM

## 2023-08-10 DIAGNOSIS — F02.82 ALZHEIMER'S DEMENTIA WITH PSYCHOTIC DISTURBANCE, UNSPECIFIED DEMENTIA SEVERITY, UNSPECIFIED TIMING OF DEMENTIA ONSET (HCC): ICD-10-CM

## 2023-08-10 DIAGNOSIS — I25.10 CORONARY ARTERY DISEASE, UNSPECIFIED VESSEL OR LESION TYPE, UNSPECIFIED WHETHER ANGINA PRESENT, UNSPECIFIED WHETHER NATIVE OR TRANSPLANTED HEART: ICD-10-CM

## 2023-08-10 DIAGNOSIS — E78.5 HYPERLIPIDEMIA, UNSPECIFIED HYPERLIPIDEMIA TYPE: ICD-10-CM

## 2023-08-10 DIAGNOSIS — I10 ESSENTIAL HYPERTENSION: ICD-10-CM

## 2023-08-10 DIAGNOSIS — J43.9 PULMONARY EMPHYSEMA, UNSPECIFIED EMPHYSEMA TYPE (HCC): ICD-10-CM

## 2023-08-10 PROBLEM — R41.3 MEMORY LOSS, SHORT TERM: Status: RESOLVED | Noted: 2021-11-09 | Resolved: 2023-08-10

## 2023-08-10 PROCEDURE — 1036F TOBACCO NON-USER: CPT | Performed by: NURSE PRACTITIONER

## 2023-08-10 PROCEDURE — G8417 CALC BMI ABV UP PARAM F/U: HCPCS | Performed by: NURSE PRACTITIONER

## 2023-08-10 PROCEDURE — 3017F COLORECTAL CA SCREEN DOC REV: CPT | Performed by: NURSE PRACTITIONER

## 2023-08-10 PROCEDURE — 3075F SYST BP GE 130 - 139MM HG: CPT | Performed by: NURSE PRACTITIONER

## 2023-08-10 PROCEDURE — 3078F DIAST BP <80 MM HG: CPT | Performed by: NURSE PRACTITIONER

## 2023-08-10 PROCEDURE — 99214 OFFICE O/P EST MOD 30 MIN: CPT | Performed by: NURSE PRACTITIONER

## 2023-08-10 PROCEDURE — 3023F SPIROM DOC REV: CPT | Performed by: NURSE PRACTITIONER

## 2023-08-10 PROCEDURE — G8427 DOCREV CUR MEDS BY ELIG CLIN: HCPCS | Performed by: NURSE PRACTITIONER

## 2023-08-10 PROCEDURE — 93000 ELECTROCARDIOGRAM COMPLETE: CPT | Performed by: NURSE PRACTITIONER

## 2023-08-10 RX ORDER — ATORVASTATIN CALCIUM 80 MG/1
80 TABLET, FILM COATED ORAL DAILY
Qty: 90 TABLET | Refills: 1 | Status: SHIPPED | OUTPATIENT
Start: 2023-08-10

## 2023-08-10 RX ORDER — METOPROLOL TARTRATE 50 MG/1
TABLET, FILM COATED ORAL
Qty: 180 TABLET | Refills: 1 | Status: SHIPPED | OUTPATIENT
Start: 2023-08-10

## 2023-08-10 RX ORDER — FOLIC ACID 1 MG/1
TABLET ORAL
Qty: 90 TABLET | Refills: 1 | Status: SHIPPED | OUTPATIENT
Start: 2023-08-10

## 2023-08-10 RX ORDER — ALBUTEROL SULFATE 90 UG/1
2 AEROSOL, METERED RESPIRATORY (INHALATION) 4 TIMES DAILY PRN
Qty: 18 G | Refills: 0 | Status: SHIPPED | OUTPATIENT
Start: 2023-08-10

## 2023-08-10 RX ORDER — TICAGRELOR 90 MG/1
TABLET ORAL
Qty: 180 TABLET | Refills: 1 | Status: SHIPPED | OUTPATIENT
Start: 2023-08-10

## 2023-08-10 RX ORDER — DONEPEZIL HYDROCHLORIDE 10 MG/1
10 TABLET, FILM COATED ORAL NIGHTLY
Qty: 90 TABLET | Refills: 1 | Status: SHIPPED | OUTPATIENT
Start: 2023-08-10

## 2023-08-10 ASSESSMENT — ENCOUNTER SYMPTOMS
FACIAL SWELLING: 0
VOICE CHANGE: 0
COLOR CHANGE: 0
ABDOMINAL PAIN: 0
DIARRHEA: 0
VOMITING: 0
CHEST TIGHTNESS: 0
EYES NEGATIVE: 1
COUGH: 0
SHORTNESS OF BREATH: 0
APNEA: 0
RHINORRHEA: 0
CONSTIPATION: 0
ABDOMINAL DISTENTION: 0
WHEEZING: 0
BACK PAIN: 0
NAUSEA: 0

## 2023-08-10 ASSESSMENT — PATIENT HEALTH QUESTIONNAIRE - PHQ9
SUM OF ALL RESPONSES TO PHQ QUESTIONS 1-9: 0
SUM OF ALL RESPONSES TO PHQ9 QUESTIONS 1 & 2: 0
1. LITTLE INTEREST OR PLEASURE IN DOING THINGS: 0
2. FEELING DOWN, DEPRESSED OR HOPELESS: 0
SUM OF ALL RESPONSES TO PHQ QUESTIONS 1-9: 0

## 2023-08-10 NOTE — CARE COORDINATION
[employer] placed a call to Lory's  Jose Magdi today as discussed to see how the PCP visit went and follow up on the SNF placement that Jose Fairbanks had requested. There was a possible need for a change in Lory's MADI to be admitted into one facility but there were other options for SNF that may accept her current [facility]. There was no answer when call was made today.  was left with [employer] information and request for a return call. Per OV note from PCP today it appears that [facility] has decided to keep Lory at home instead of placing her. [employer] will try to get confirmation from Jose Fairbanks on above when call is returned.

## 2023-08-10 NOTE — PROGRESS NOTES
work done in quite some time. The patient is not up-to-date with health maintenance screenings. Previous lab work was reviewed. Review of Systems   Constitutional:  Positive for fatigue. Negative for activity change, appetite change, fever and unexpected weight change. HENT:  Negative for congestion, facial swelling, mouth sores, postnasal drip, rhinorrhea, sneezing, tinnitus and voice change. Eyes: Negative. Respiratory:  Negative for apnea, cough, chest tightness, shortness of breath and wheezing. Cardiovascular:  Negative for chest pain, palpitations and leg swelling. Gastrointestinal:  Negative for abdominal distention, abdominal pain, constipation, diarrhea, nausea and vomiting. Endocrine: Negative for cold intolerance and heat intolerance. Genitourinary:  Negative for difficulty urinating, dysuria, flank pain, frequency, pelvic pain and urgency. Musculoskeletal:  Negative for back pain, gait problem, joint swelling, myalgias and neck pain. Skin:  Negative for color change, pallor, rash and wound. Allergic/Immunologic: Negative for environmental allergies and food allergies. Neurological:  Negative for dizziness, tremors, seizures, syncope, facial asymmetry, speech difficulty, weakness, light-headedness, numbness and headaches. Psychiatric/Behavioral:  Positive for agitation, behavioral problems (combative at times), confusion (alert to name only), hallucinations (delusional) and sleep disturbance. Negative for decreased concentration, dysphoric mood and suicidal ideas. The patient is nervous/anxious. The patient is not hyperactive.       Medications:     Current Outpatient Medications:     metoprolol tartrate (LOPRESSOR) 50 MG tablet, TAKE 1 TABLET BY MOUTH TWICE DAILY, Disp: 180 tablet, Rfl: 1    folic acid (FOLVITE) 1 MG tablet, TAKE ONE TABLET BY MOUTH DAILY, Disp: 90 tablet, Rfl: 1    donepezil (ARICEPT) 10 MG tablet, Take 1 tablet by mouth nightly, Disp: 90 tablet, Rfl: 1

## 2023-08-16 ENCOUNTER — CARE COORDINATION (OUTPATIENT)
Dept: CARE COORDINATION | Age: 63
End: 2023-08-16

## 2023-08-16 NOTE — CARE COORDINATION
[facility] placed another outreach call to Romaine vásquez regarding follow up and needs for Familia Pelonstefan. There was no answer.  was left again with [facility] information and request for Romaine vásquez to call [facility] back. [facility] will sign off if no return call received and one more outreach attempt this week.

## 2023-08-18 ENCOUNTER — CARE COORDINATION (OUTPATIENT)
Dept: CARE COORDINATION | Age: 63
End: 2023-08-18

## 2023-08-18 NOTE — CARE COORDINATION
[facility] placed a final outreach call to Valarie Ferreira  Jimenez Garces. There was no answer.  was left indicating this was the final outreach by [facility]. [facility] information was left on  as well.     [facility] to sign off today

## 2024-02-05 ENCOUNTER — TELEPHONE (OUTPATIENT)
Dept: PRIMARY CARE CLINIC | Age: 64
End: 2024-02-05

## 2024-02-05 ENCOUNTER — CARE COORDINATION (OUTPATIENT)
Dept: CARE COORDINATION | Age: 64
End: 2024-02-05

## 2024-02-05 DIAGNOSIS — F02.82 ALZHEIMER'S DEMENTIA WITH PSYCHOTIC DISTURBANCE, UNSPECIFIED DEMENTIA SEVERITY, UNSPECIFIED TIMING OF DEMENTIA ONSET (HCC): Primary | ICD-10-CM

## 2024-02-05 DIAGNOSIS — O92.79 NURSING DIFFICULTY: ICD-10-CM

## 2024-02-05 DIAGNOSIS — G30.9 ALZHEIMER'S DEMENTIA WITH PSYCHOTIC DISTURBANCE, UNSPECIFIED DEMENTIA SEVERITY, UNSPECIFIED TIMING OF DEMENTIA ONSET (HCC): Primary | ICD-10-CM

## 2024-02-05 NOTE — TELEPHONE ENCOUNTER
called in and said he is needing help to get his wife placed somewhere for her dementia. He states it is getting worse. He states that she was placed somewhere previously, and walked out within the first few hours. He has had her at home, but is looking for placement again. He was previously talking with a care coordinator about this and gettting help from them, but when he decided to keep her at home, he stopped communicating with care coordination.

## 2024-02-05 NOTE — CARE COORDINATION
[facility] received a referral today from the PCP regarding placement for Lory.   [facility] had been working with Lory's  last year when he stopped answering calls and returning calls to [facility].    had wanted placement for Lory at that time and reported he had her at [facility] but she \"escaped\" within 24 hours.   He ultimately selected to keep her home and not look for other placement options.     [facility] notes that Lory has an OV on 2/16 which will be needed for any placement.   [facility] did make a call to [facility] to see if they have a locked Alzh/dementia unit and if they are in network with [employer] insurance.   Spoke with Lynn in admissions who states they do take the insurance (although there are different levels/plans) and currently they do have a couple female beds open.    [facility] did not reveal any personal pt information and stated that once contact was made with the  and he was agreeable a call back to Lynn would be made.   [facility] also updated Lynn that the OV is on 2/16 so placement would need to occur after that.   Usually when approval is given there is a limited time to get the person into the SNF.   Last time that became an issue with Wai as he didn't want to place Lory during certain days/events.       [facility] will follow with a full outreach call to Wai and open full assessment accordingly.

## 2024-02-05 NOTE — TELEPHONE ENCOUNTER
Spoke with  and offered appt for 02/16 at 4:30. He said he would take that appt and then said that he has a problem. He states that he can not get her to take a shower and she is very 'ripe'. He states that it has been 3 weeks since he has gotten her to shower and wants to know if there is something that can be called in for her bc if he brings her in here like that, everyone will be running. I spoke with you and then called patient back and advised that he would need to take her to the ED. He immediately asked, 'For what?!' I tried to tell him that [facility] does have a vaughn that they can admit a patient to for medication adjustments if needed and he did not want to hear of that. He states that the only medication she takes is for BP, and the only problem he has is that she won't shower. I asked him if he is having any other issues with her and he states no. Just that she won't shower. I advised that I booked her in that appt spot and that he can discuss her behavior with you at that appt. He agreed to bring her at that time.

## 2024-02-08 ENCOUNTER — CARE COORDINATION (OUTPATIENT)
Dept: CARE COORDINATION | Age: 64
End: 2024-02-08

## 2024-02-08 NOTE — CARE COORDINATION
[facility] called and spoke with Lory's  Wai.   Reviewed [facility] referral from the summer and placement issues at that time.   Reviewed new referral request for [facility] for placement now.   Wai states that he does recall this and he did ask for the placement help.        [facility] reviewed with Wai that an assessment will need to be completed again, however, Wai states that he is not able to do that with [facility] at this time.    [facility] reviewed with Wai that based on the previous issues finding a facility that accepted the [place] insurance and had a dementia unit that was secure, [facility] had placed a call this week to check on at least one facility.   [facility] shared with Wai that the facility was [facility]) and where it was located.  [facility] explained that no personal information about Lory was given and asked permission to contact [facility] back to share information and have the insurance verification started.   Wai states that he is not agreeable to that at this time.  He asked about places in [facility].  [facility] gave a list of nursing homes that have dementia units in [facility] and asked if any of them could be contacted today.  Wai states he does not want any of them called as of yet and wants to wait and see what the PCP says when he takes Lory in on 2/16.   [facility] inquired if Wai was debating on not placing Lory and he states no but still refused to let [facility] contact any facility for the start of verifying the insurance.   [facility] tried to process with Wai that there is a limited time window of when Lory sees the PCP and getting her into a facility so starting this process now is the best option.  Wai states he understands but still declined CC to contact any nursing home with information on Lory.       [facility] is unable to assist with placement without full cooperation of the .    was not cooperative last time with placement.   [facility] will attempt to follow with

## 2024-02-12 ENCOUNTER — TELEPHONE (OUTPATIENT)
Dept: PRIMARY CARE CLINIC | Age: 64
End: 2024-02-12
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
## 2024-02-14 ENCOUNTER — CARE COORDINATION (OUTPATIENT)
Dept: CARE COORDINATION | Age: 64
End: 2024-02-14

## 2024-02-14 NOTE — CARE COORDINATION
[facility] placed an outreach call to Lory's  Wai in return of a voice message he left for [facility].   There was no answer.   [facility] left a voice message for Wai requesting a return call.   Call back number was left.        [facility] will follow accordingly.

## 2024-02-16 ENCOUNTER — OFFICE VISIT (OUTPATIENT)
Dept: PRIMARY CARE CLINIC | Age: 64
End: 2024-02-16
Payer: MEDICAID

## 2024-02-16 ENCOUNTER — TELEPHONE (OUTPATIENT)
Dept: PRIMARY CARE CLINIC | Age: 64
End: 2024-02-16

## 2024-02-16 ENCOUNTER — CARE COORDINATION (OUTPATIENT)
Dept: CARE COORDINATION | Age: 64
End: 2024-02-16

## 2024-02-16 VITALS — BODY MASS INDEX: 30.55 KG/M2 | RESPIRATION RATE: 16 BRPM | WEIGHT: 166 LBS | HEIGHT: 62 IN

## 2024-02-16 DIAGNOSIS — J43.9 PULMONARY EMPHYSEMA, UNSPECIFIED EMPHYSEMA TYPE (HCC): ICD-10-CM

## 2024-02-16 DIAGNOSIS — E78.5 HYPERLIPIDEMIA, UNSPECIFIED HYPERLIPIDEMIA TYPE: ICD-10-CM

## 2024-02-16 DIAGNOSIS — Z72.0 TOBACCO ABUSE: ICD-10-CM

## 2024-02-16 DIAGNOSIS — I10 ESSENTIAL HYPERTENSION: ICD-10-CM

## 2024-02-16 DIAGNOSIS — G30.9 ALZHEIMER'S DEMENTIA WITH PSYCHOTIC DISTURBANCE, UNSPECIFIED DEMENTIA SEVERITY, UNSPECIFIED TIMING OF DEMENTIA ONSET (HCC): ICD-10-CM

## 2024-02-16 DIAGNOSIS — I25.10 CORONARY ARTERY DISEASE, UNSPECIFIED VESSEL OR LESION TYPE, UNSPECIFIED WHETHER ANGINA PRESENT, UNSPECIFIED WHETHER NATIVE OR TRANSPLANTED HEART: ICD-10-CM

## 2024-02-16 DIAGNOSIS — F02.82 ALZHEIMER'S DEMENTIA WITH PSYCHOTIC DISTURBANCE, UNSPECIFIED DEMENTIA SEVERITY, UNSPECIFIED TIMING OF DEMENTIA ONSET (HCC): ICD-10-CM

## 2024-02-16 DIAGNOSIS — F41.9 ANXIETY: Primary | ICD-10-CM

## 2024-02-16 PROCEDURE — G8417 CALC BMI ABV UP PARAM F/U: HCPCS | Performed by: NURSE PRACTITIONER

## 2024-02-16 PROCEDURE — 3017F COLORECTAL CA SCREEN DOC REV: CPT | Performed by: NURSE PRACTITIONER

## 2024-02-16 PROCEDURE — 1036F TOBACCO NON-USER: CPT | Performed by: NURSE PRACTITIONER

## 2024-02-16 PROCEDURE — G8427 DOCREV CUR MEDS BY ELIG CLIN: HCPCS | Performed by: NURSE PRACTITIONER

## 2024-02-16 PROCEDURE — 99214 OFFICE O/P EST MOD 30 MIN: CPT | Performed by: NURSE PRACTITIONER

## 2024-02-16 PROCEDURE — 3023F SPIROM DOC REV: CPT | Performed by: NURSE PRACTITIONER

## 2024-02-16 PROCEDURE — G8484 FLU IMMUNIZE NO ADMIN: HCPCS | Performed by: NURSE PRACTITIONER

## 2024-02-16 RX ORDER — METOPROLOL TARTRATE 50 MG/1
TABLET, FILM COATED ORAL
Qty: 180 TABLET | Refills: 1 | Status: SHIPPED | OUTPATIENT
Start: 2024-02-16

## 2024-02-16 RX ORDER — ATORVASTATIN CALCIUM 80 MG/1
80 TABLET, FILM COATED ORAL DAILY
Qty: 90 TABLET | Refills: 1 | Status: SHIPPED | OUTPATIENT
Start: 2024-02-16

## 2024-02-16 RX ORDER — ALBUTEROL SULFATE 90 UG/1
2 AEROSOL, METERED RESPIRATORY (INHALATION) 4 TIMES DAILY PRN
Qty: 18 G | Refills: 0 | Status: SHIPPED | OUTPATIENT
Start: 2024-02-16

## 2024-02-16 RX ORDER — MEMANTINE HYDROCHLORIDE 5 MG/1
5 TABLET ORAL 2 TIMES DAILY
Qty: 180 TABLET | Refills: 1 | Status: SHIPPED | OUTPATIENT
Start: 2024-02-16

## 2024-02-16 RX ORDER — FOLIC ACID 1 MG/1
TABLET ORAL
Qty: 90 TABLET | Refills: 1 | Status: SHIPPED | OUTPATIENT
Start: 2024-02-16

## 2024-02-16 RX ORDER — DONEPEZIL HYDROCHLORIDE 10 MG/1
10 TABLET, FILM COATED ORAL NIGHTLY
Qty: 90 TABLET | Refills: 1 | Status: SHIPPED | OUTPATIENT
Start: 2024-02-16

## 2024-02-16 RX ORDER — TICAGRELOR 90 MG/1
TABLET ORAL
Qty: 180 TABLET | Refills: 1 | Status: SHIPPED | OUTPATIENT
Start: 2024-02-16

## 2024-02-16 RX ORDER — OLANZAPINE 2.5 MG/1
2.5 TABLET, FILM COATED ORAL NIGHTLY
Qty: 30 TABLET | Refills: 0 | Status: SHIPPED | OUTPATIENT
Start: 2024-02-16

## 2024-02-16 NOTE — ASSESSMENT & PLAN NOTE
Poorly controlled.   reports that she is having delusions and agitation.  She is restless at nighttime and combative.  He can no longer take care of her in the home and requires long-term care facility placement.  Will continue Aricept and add on Namenda.  Will also start the patient on Zyprexa in the interim.

## 2024-02-16 NOTE — CARE COORDINATION
Initial Contact Social Work Note - Ambulatory  2/16/2024      Date of referral: 2/5/24  Referral received from: Dr. Rollins  Reason for referral: nursing home placement    Previous SW referral: Yes  If yes, brief summary of outcome:  refused placement and disengaged from [facility]    Two Identifiers Verified: Yes    Insurance Provider: [facility]     Support System:  Spouse/Partner     Status: Spouse of [place] - no active war service; no VA services    Community Providers: SNAP/Food [place]    ADL Assistance Needed: Bathing, Dressing, Toileting  , and cooking, cleaning, shopping, medication    Housing/Living Concerns or Home Modification Needs: NA      Transportation Concern: NA - doesn't drive but  does    Medication Cost Concern: NA     Medication Adherence Concern: NA    Financial Concern(s): NA    Income (only if applicable): zero     Ability to Read/Write:  states she is not able to any longer    Advance Care Plan:  N/A    Other: NA    Identified Needs:  Need nursing home placement    Social Work Plan:  Reviewed facilities that take dem/alz   Referral calls to multiple [facility] homes  Call to PCP office for updates/OV w/pt today  Next Steps: follow up call     Method of Communication With Provider (if appropriate): Chart Routing       Goals Addressed    None          [facility] spoke with Wai today and completed the above assessment.   Wai is aware the referral is for placement of his wife Lory into a nursing facility due to her increased dementia/alzh disease.         Wai states that Lory needs help with most ADL/IADL's.   She is able to ambulate on her own with no DME.   Wai states he is the only help Lory has.  There is no family or friends that help.   There is no community agency involved for help in the home.         Wai recalls working with this [facility] in the past and verbalized that he quit communicating at that time for help with placement of Lory.   He states

## 2024-02-16 NOTE — ASSESSMENT & PLAN NOTE
Does not follow with cardiology.  Is not medically optimized due to noncompliance.  Currently on beta-blocker, statin and Brilinta.  Not interested in adding medications on at this time.

## 2024-02-16 NOTE — TELEPHONE ENCOUNTER
Care coordination,  has finally decided that he is willing to send patient somewhere. [facility] is the only place that will take her. When she is here, the office visit note needs to put that due to increased behaviors she needs to have placement in long term care facility. She also needs to have orders for physical therapy, speech therapy, or occupational therapy.      Send med list     Send insurance card      Fax to [phone] [facility] directors of admissions
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
## 2024-02-16 NOTE — ASSESSMENT & PLAN NOTE
Patient and  declined lab work due to agitation.  Currently on atorvastatin.  Last lab work 2021, LDL 89, triglycerides 172.  Total cholesterol 158.  LFTs were within normal limits.

## 2024-02-16 NOTE — ASSESSMENT & PLAN NOTE
Poorly controlled due to worsening dementia.  Currently on Aricept 10 mg daily.  Will add on Namenda.

## 2024-02-21 ENCOUNTER — CARE COORDINATION (OUTPATIENT)
Dept: CARE COORDINATION | Age: 64
End: 2024-02-21

## 2024-02-21 NOTE — CARE COORDINATION
[facility] called and spoke with Wai, Lory's , for updates regarding the nursing home placement.        Reviewed with Wai the places that couldn't take Lory and the referral placed with [facility].  Reviewed waiting on [place] [facility] to get approval from the insurance.   [facility] placed a merged call to [facility] and spoke with admissions (Afua).   She updated Wai that the insurance is reviewing the request and asked for more information which they did send over.   Afua states they should have a decision from the insurance by tomorrow.  [facility] has accepted Lory, they are waiting to hear from the insurance if they will pay for her to go into the facility for LTC.      [place] will contact this [facility] with the final decision and then [facility] can merge Wai into the call so he can hear and ask questions.  Hopefully the decision is favorable and comes in tomorrow.        [facility] to follow

## 2024-02-22 ENCOUNTER — CARE COORDINATION (OUTPATIENT)
Dept: CARE COORDINATION | Age: 64
End: 2024-02-22

## 2024-02-22 ENCOUNTER — TELEPHONE (OUTPATIENT)
Dept: PRIMARY CARE CLINIC | Age: 64
End: 2024-02-22

## 2024-02-22 NOTE — CARE COORDINATION
[facility] received a call from [place] with [facility].  She states the insurance for Lory will not approve her admission unless they have an order from the PCP stating that Lory is unable to stay in the home and needs LTC placement for alzheimer/dementia with behavior issues.  That Lory's level of care is not able to be provided for in the home setting and requires placement in a facility for alzheimer/dementia care.      [facility] placed a call to [facility] and left a voice message with the information above.     [facility] notes that the office visit note from 2/16 does include all of the information that is being requested by Louie BARRAZA.  [facility] is not sure why a second order from the PCP is needed with the same statements.   [facility] did reach back out to [place] in admissions with [facility] to inquire but reached her voice mail.  A voice message was left stating parts of the OV note that indicate the information being requested and why it was needed again.   [facility] will a wait a call back from [place] ([facility]).   [facility] did leave the requested message for PCP with fax number to the facility.      [facility] to follow accordingly

## 2024-02-22 NOTE — TELEPHONE ENCOUNTER
Jany from [facility] called stated that she left a msg for you stating that [facility] Fermin called lesia that insurance will not approve nursing Home admission unless order is placed and reasoning being unable to stay at home due to needing long term care because of Alzheimer/Dementia care with behavorial issues.  Level of care is unable to be in home.    Fax Order to [phone]  If Questions you can call Jany at [phone]

## 2024-02-23 DIAGNOSIS — G30.9 ALZHEIMER'S DEMENTIA WITH PSYCHOTIC DISTURBANCE, UNSPECIFIED DEMENTIA SEVERITY, UNSPECIFIED TIMING OF DEMENTIA ONSET (HCC): Primary | ICD-10-CM

## 2024-02-23 DIAGNOSIS — F02.82 ALZHEIMER'S DEMENTIA WITH PSYCHOTIC DISTURBANCE, UNSPECIFIED DEMENTIA SEVERITY, UNSPECIFIED TIMING OF DEMENTIA ONSET (HCC): Primary | ICD-10-CM

## 2024-02-27 ENCOUNTER — CARE COORDINATION (OUTPATIENT)
Dept: CARE COORDINATION | Age: 64
End: 2024-02-27

## 2024-02-27 NOTE — CARE COORDINATION
[facility] spoke with Afua today in admissions from [facility].  Reviewed the order that was sent to her on 2/22.   Afua states she did not receive it.   [facility] placed a call to [facility] office to inquire as to the order that is needed for the insurance to give approval for admission.   Office was able to review with Afua what the prescription that the PCP ordered states.  Afua states that it should be enough to get the approval.  Office will resend this order to Afua now and she will resubmit.    [place] will reach out and update [facility].       [facility] placed a call to Wai, Lory's , to update him on above.  Reviewed with Wai.   He verbalized understanding and is waiting patiently for insurance approval.         [facility] will follow up with Wai once approval decision is received from Afua at [facility].

## 2024-02-28 ENCOUNTER — CARE COORDINATION (OUTPATIENT)
Dept: CARE COORDINATION | Age: 64
End: 2024-02-28

## 2024-02-28 NOTE — CARE COORDINATION
[facility] spoke with Wai for updates on when he will be taking Lory to the nursing home.  Wai reports that he is taking her on Monday 3/4/24.   Wai is agreeable to [facility] signing off today.  [facility] will route final note to PCP for updates on placement.

## 2024-02-28 NOTE — CARE COORDINATION
HEBERT received a call from Afua at [facility].  [place] states the insurance approved an initial 30 days for Lory and then will need updated information from the nursing home to approve full LTC stay.  [place] states she will contact Wai to update him on the above as well.       [facility] called and spoke with Wai as well.   Reviewed what HEBERT was told earlier, Wai states he hasn't received a call from the nursing [facility] yet.  [facility] gave Wai the number to [facility] again so he has it to call with any questions.       [facility] inquired as to what day Wai plans to take Lory to [facility].  He states he is looking at Saturday as long as the facility is agreeable.  If he has to wait until Monday he will do that.   Wai states he will call the facility and then call [facility] back when he knows which day he will take Lory.  HEBERT agreeable.

## 2024-03-04 ENCOUNTER — TELEPHONE (OUTPATIENT)
Dept: PRIMARY CARE CLINIC | Age: 64
End: 2024-03-04

## 2024-03-04 NOTE — TELEPHONE ENCOUNTER
[facility] is calling to request a letter stating that patient is not competent to care for herself. They are trying to get the  guardianship or just get her a guardian in general .

## 2024-03-19 ENCOUNTER — CARE COORDINATION (OUTPATIENT)
Dept: CARE COORDINATION | Age: 64
End: 2024-03-19

## 2024-03-19 NOTE — TELEPHONE ENCOUNTER
Appointment made.  had an appointment at 1130 on April 11th, so I just added a 15 min appointment for jhon at that time     Last Appointment:  Visit date not found  Future Appointments   Date Time Provider Department Center   4/11/2024 11:30 AM Senait Rollins APRN - NP [facility]

## 2024-03-19 NOTE — CARE COORDINATION
[facility] received a call from Wai regarding his wife Lory.   He states he is very unhappy with the nursing home that she was supposed to be in.  He has gone and looked at some other options and selected [facility] in [place], [place] ([facility]   Wai states he is aware [facility] did ask him to tour facilities before placing Lory and he is not upset with  or CC.  He states he just doesn't like the facility, [facility], and that he never followed through with sending her there.      Wai state that [facility] needs the information on Lory but [facility] is not cooperating with sending it to them.   [facility] reviewed what information was being requested.  Wai reports it is the information that Dr. Rollins's office sent over to [facility].   [facility] encouraged Wai to contact the PCP office for those documents and request them to be faxed to the new facility he wants to move Lory to.   Wai verbalized understanding.        [facility] was unaware that Lory was never placed into the [facility] facility that Wai originally agreed to.   Lory has been home the entire time.       [facility] has concerns that the OV is from 2/16 and outside the 30 day window given that she was never actually placed into the nursing home.  [facility] encouraged Wai to contact the person he is working with at [facility] and ensure that Lory is not going to require a more recent OV for pre cert with Louie.  Wai states he will contact them after he calls the PCP office today to request the information be faxed.      [facility] will leave the episode open for now.

## 2024-03-26 ENCOUNTER — CARE COORDINATION (OUTPATIENT)
Dept: CARE COORDINATION | Age: 64
End: 2024-03-26

## 2024-03-26 NOTE — CARE COORDINATION
[facility] placed a follow up call to Lory's  Wai.  Reviewed new placement request at [facility].  Wai states that Lory does need to have a new OV as HEBERT suspected.  He states her OV is set for 4/11/24.   Wai is calling [facility] today to inquire what other information they will need to accept Lory.       [facility] reviewed with Wai to make sure he has a name and fax number for someone at [facility] so the new OV note and medication list, etc can be faxed directly there following the OV on 4/11.   [facility] reviewed with Wai that the information PCP had in the office visit note from 2/16 is the same information that will be needed now as well.  [facility] reviewed with Wai that PCP is typically not in the office on Friday and that he needs to ensure the information for who and where to fax is given at Lory's OV.   Wai verbalized understanding.       Lory's insurance required a precert for the other nursing homes and will require one for this admission as well.  [facility] uncertain on the length of time that precert will take.      [facility] will follow up with Wai accordingly

## 2024-04-04 ENCOUNTER — TELEPHONE (OUTPATIENT)
Dept: PRIMARY CARE CLINIC | Age: 64
End: 2024-04-04

## 2024-04-04 NOTE — TELEPHONE ENCOUNTER
Betty from [facility] called and said  is working with them to admit patient to their long term care facility and she will need some information. They will need most recent H&P and current med list.  Fax number there is [phone] and facility phone number is [phone]. I will fax this information to this number.

## 2024-04-12 ENCOUNTER — CARE COORDINATION (OUTPATIENT)
Dept: CARE COORDINATION | Age: 64
End: 2024-04-12

## 2024-04-12 NOTE — CARE COORDINATION
HEBERT spoke with Lory's  Wai today.   Wai states the PCP OV for 4/11 was changed by PCP office to 4/18.   Reviewed that the OV note will be needed for the admit to the new nursing home Wai selected.  HEBERT notes that the [facility] home has reached out to the PCP office with regards to what they will need for admission.   The insurance will need to review and give authorization (precert) before she can be admitted, Wai is aware of this.      Wai states he is managing with Lory at home so far.  He hasn't changed his mind on placement though.  CC provided active listening.      [facility] to follow

## 2024-04-18 ENCOUNTER — OFFICE VISIT (OUTPATIENT)
Dept: PRIMARY CARE CLINIC | Age: 64
End: 2024-04-18
Payer: MEDICAID

## 2024-04-18 VITALS
BODY MASS INDEX: 30 KG/M2 | SYSTOLIC BLOOD PRESSURE: 118 MMHG | WEIGHT: 163 LBS | HEART RATE: 84 BPM | DIASTOLIC BLOOD PRESSURE: 80 MMHG | TEMPERATURE: 98 F | HEIGHT: 62 IN | OXYGEN SATURATION: 98 %

## 2024-04-18 DIAGNOSIS — J43.9 PULMONARY EMPHYSEMA, UNSPECIFIED EMPHYSEMA TYPE (HCC): ICD-10-CM

## 2024-04-18 DIAGNOSIS — Z79.899 HIGH RISK MEDICATION USE: ICD-10-CM

## 2024-04-18 DIAGNOSIS — G30.9 ALZHEIMER'S DEMENTIA WITH PSYCHOTIC DISTURBANCE, UNSPECIFIED DEMENTIA SEVERITY, UNSPECIFIED TIMING OF DEMENTIA ONSET (HCC): Primary | ICD-10-CM

## 2024-04-18 DIAGNOSIS — F02.82 ALZHEIMER'S DEMENTIA WITH PSYCHOTIC DISTURBANCE, UNSPECIFIED DEMENTIA SEVERITY, UNSPECIFIED TIMING OF DEMENTIA ONSET (HCC): Primary | ICD-10-CM

## 2024-04-18 DIAGNOSIS — E78.2 MIXED HYPERLIPIDEMIA: ICD-10-CM

## 2024-04-18 DIAGNOSIS — F02.82 ALZHEIMER'S DEMENTIA WITH PSYCHOTIC DISTURBANCE, UNSPECIFIED DEMENTIA SEVERITY, UNSPECIFIED TIMING OF DEMENTIA ONSET (HCC): ICD-10-CM

## 2024-04-18 DIAGNOSIS — F41.9 ANXIETY: ICD-10-CM

## 2024-04-18 DIAGNOSIS — G30.9 ALZHEIMER'S DEMENTIA WITH PSYCHOTIC DISTURBANCE, UNSPECIFIED DEMENTIA SEVERITY, UNSPECIFIED TIMING OF DEMENTIA ONSET (HCC): ICD-10-CM

## 2024-04-18 DIAGNOSIS — Z72.0 TOBACCO ABUSE: ICD-10-CM

## 2024-04-18 DIAGNOSIS — I10 ESSENTIAL HYPERTENSION: ICD-10-CM

## 2024-04-18 DIAGNOSIS — I25.10 CORONARY ARTERY DISEASE, UNSPECIFIED VESSEL OR LESION TYPE, UNSPECIFIED WHETHER ANGINA PRESENT, UNSPECIFIED WHETHER NATIVE OR TRANSPLANTED HEART: ICD-10-CM

## 2024-04-18 LAB
ALBUMIN: 3.9 G/DL (ref 3.5–5.2)
ALP BLD-CCNC: 172 U/L (ref 35–104)
ALT SERPL-CCNC: 23 U/L (ref 0–32)
ANION GAP SERPL CALCULATED.3IONS-SCNC: 15 MMOL/L (ref 7–16)
AST SERPL-CCNC: 20 U/L (ref 0–31)
BASOPHILS ABSOLUTE: 0.07 K/UL (ref 0–0.2)
BASOPHILS RELATIVE PERCENT: 1 % (ref 0–2)
BILIRUB SERPL-MCNC: 0.5 MG/DL (ref 0–1.2)
BUN BLDV-MCNC: 13 MG/DL (ref 6–23)
CALCIUM SERPL-MCNC: 10 MG/DL (ref 8.6–10.2)
CHLORIDE BLD-SCNC: 101 MMOL/L (ref 98–107)
CHOLESTEROL: 138 MG/DL
CO2: 23 MMOL/L (ref 22–29)
CREAT SERPL-MCNC: 0.7 MG/DL (ref 0.5–1)
EOSINOPHILS ABSOLUTE: 0.15 K/UL (ref 0.05–0.5)
EOSINOPHILS RELATIVE PERCENT: 2 % (ref 0–6)
FOLATE: >20 NG/ML (ref 4.8–24.2)
GFR SERPL CREATININE-BSD FRML MDRD: >90 ML/MIN/1.73M2
GLUCOSE BLD-MCNC: 118 MG/DL (ref 74–99)
HBA1C MFR BLD: 7.9 % (ref 4–5.6)
HCT VFR BLD CALC: 50.2 % (ref 34–48)
HDLC SERPL-MCNC: 30 MG/DL
HEMOGLOBIN: 15.7 G/DL (ref 11.5–15.5)
IMMATURE GRANULOCYTES %: 0 % (ref 0–5)
IMMATURE GRANULOCYTES ABSOLUTE: 0.03 K/UL (ref 0–0.58)
LDL CHOLESTEROL: 76 MG/DL
LYMPHOCYTES ABSOLUTE: 1.32 K/UL (ref 1.5–4)
LYMPHOCYTES RELATIVE PERCENT: 13 % (ref 20–42)
MCH RBC QN AUTO: 29.8 PG (ref 26–35)
MCHC RBC AUTO-ENTMCNC: 31.3 G/DL (ref 32–34.5)
MCV RBC AUTO: 95.3 FL (ref 80–99.9)
MONOCYTES ABSOLUTE: 0.86 K/UL (ref 0.1–0.95)
MONOCYTES RELATIVE PERCENT: 9 % (ref 2–12)
NEUTROPHILS ABSOLUTE: 7.54 K/UL (ref 1.8–7.3)
NEUTROPHILS RELATIVE PERCENT: 76 % (ref 43–80)
PDW BLD-RTO: 14.6 % (ref 11.5–15)
PLATELET # BLD: 300 K/UL (ref 130–450)
PMV BLD AUTO: 11.3 FL (ref 7–12)
POTASSIUM SERPL-SCNC: 4.9 MMOL/L (ref 3.5–5)
RBC # BLD: 5.27 M/UL (ref 3.5–5.5)
SODIUM BLD-SCNC: 139 MMOL/L (ref 132–146)
TOTAL PROTEIN: 7.6 G/DL (ref 6.4–8.3)
TRIGL SERPL-MCNC: 159 MG/DL
TSH SERPL DL<=0.05 MIU/L-ACNC: 1.77 UIU/ML (ref 0.27–4.2)
VITAMIN B-12: 424 PG/ML (ref 211–946)
VITAMIN D 25-HYDROXY: 9.4 NG/ML (ref 30–100)
VLDLC SERPL CALC-MCNC: 32 MG/DL
WBC # BLD: 10 K/UL (ref 4.5–11.5)

## 2024-04-18 PROCEDURE — G8427 DOCREV CUR MEDS BY ELIG CLIN: HCPCS | Performed by: NURSE PRACTITIONER

## 2024-04-18 PROCEDURE — 99214 OFFICE O/P EST MOD 30 MIN: CPT | Performed by: NURSE PRACTITIONER

## 2024-04-18 PROCEDURE — 3074F SYST BP LT 130 MM HG: CPT | Performed by: NURSE PRACTITIONER

## 2024-04-18 PROCEDURE — 3023F SPIROM DOC REV: CPT | Performed by: NURSE PRACTITIONER

## 2024-04-18 PROCEDURE — 3079F DIAST BP 80-89 MM HG: CPT | Performed by: NURSE PRACTITIONER

## 2024-04-18 PROCEDURE — G2211 COMPLEX E/M VISIT ADD ON: HCPCS | Performed by: NURSE PRACTITIONER

## 2024-04-18 PROCEDURE — 3017F COLORECTAL CA SCREEN DOC REV: CPT | Performed by: NURSE PRACTITIONER

## 2024-04-18 PROCEDURE — G8417 CALC BMI ABV UP PARAM F/U: HCPCS | Performed by: NURSE PRACTITIONER

## 2024-04-18 PROCEDURE — 1036F TOBACCO NON-USER: CPT | Performed by: NURSE PRACTITIONER

## 2024-04-18 RX ORDER — OLANZAPINE 5 MG/1
5 TABLET ORAL NIGHTLY
Qty: 30 TABLET | Refills: 2 | Status: SHIPPED | OUTPATIENT
Start: 2024-04-18

## 2024-04-18 ASSESSMENT — PATIENT HEALTH QUESTIONNAIRE - PHQ9: DEPRESSION UNABLE TO ASSESS: FUNCTIONAL CAPACITY MOTIVATION LIMITS ACCURACY

## 2024-04-22 RX ORDER — ERGOCALCIFEROL 1.25 MG/1
50000 CAPSULE ORAL WEEKLY
Qty: 4 CAPSULE | Refills: 5 | Status: SHIPPED | OUTPATIENT
Start: 2024-04-22

## 2024-04-22 RX ORDER — METFORMIN HYDROCHLORIDE 500 MG/1
500 TABLET, EXTENDED RELEASE ORAL
Qty: 30 TABLET | Refills: 5 | Status: SHIPPED | OUTPATIENT
Start: 2024-04-22

## 2024-04-24 ENCOUNTER — TELEPHONE (OUTPATIENT)
Dept: PRIMARY CARE CLINIC | Age: 64
End: 2024-04-24

## 2024-04-24 NOTE — TELEPHONE ENCOUNTER
Patient is due for a mammogram, one was not ordered for her at last office visit. I know she is being checked into a facility, so I am not sure if she even needs one

## 2024-04-29 ASSESSMENT — ENCOUNTER SYMPTOMS
SHORTNESS OF BREATH: 0
ABDOMINAL PAIN: 0
COUGH: 0
VOICE CHANGE: 0
BACK PAIN: 0
DIARRHEA: 0
FACIAL SWELLING: 0
NAUSEA: 0
WHEEZING: 0
RHINORRHEA: 0
CHEST TIGHTNESS: 0
COLOR CHANGE: 0
ABDOMINAL DISTENTION: 0
EYES NEGATIVE: 1
VOMITING: 0
APNEA: 0
CONSTIPATION: 0

## 2024-04-29 NOTE — ASSESSMENT & PLAN NOTE
Repeat labs.  Currently on atorvastatin.  Last lab work 2021, LDL 89, triglycerides 172.  Total cholesterol 158.  LFTs were within normal limits.

## 2024-04-29 NOTE — ASSESSMENT & PLAN NOTE
Poorly controlled.   reports that she is having delusions and agitation.  She is restless at nighttime and combative.  He can no longer take care of her in the home and requires long-term care facility placement.  Will continue Aricept and add on Namenda.  Continue Zyprexa.

## 2024-04-29 NOTE — ASSESSMENT & PLAN NOTE
Continues to smoke.  No interest in quitting.  She is not compliant with inhalers.  She does use albuterol as needed.

## 2024-04-29 NOTE — PROGRESS NOTES
ESTABLISHED PRIMARY CARE VISIT    22  Name: Lory Martino   : 1960   Age: 63 y.o.  Sex: female    Subjective:     Chief Complaint   Patient presents with    Dementia       Lory Martino presents to the office for follow-up on their chronic problems.  History is provided by her  who is her primary care provider.  Today in the office issues were addressed including:  Dementia-progressively worsening with associated intermittent behavioral disturbances/agitation-patient is currently only alert to first name only.  She cannot recall last name, birthday or any other situational information. I did refer to neurology in the past, she has not established.  She is unable to perform ADLs, administer her own medications and depends on her  for all care.  Has been reports that symptoms have significantly worsened and she can be combative, irritable with delusions at time. Minimally improved with Zyprexa.  Previously we had the patient admitted to a skilled nursing numerous times and then patient left AMA.  She is now at home with her .  He is her primary caregiver. He is tearful discussing this, but reports he can no longer care for her in the home. Plan is for SNF placement in [place]  Tobacco abuse/pulmonary emphysema-current everyday smoker smoking about a 1/2 pack of cigarettes/day.  She is due for CT lung screening,  declines.  She denies any shortness of breath.  She is not on any maintenance inhalers.  She does not follow with pulmonology and has not had PFTs.  She does use albuterol rarely.  Hypertension/hyperlipidemia/CAD-denies any chest pain or shortness of breath.  Overall patient is compliant with medication administration.  She currently does not follow with cardiology.  She has not had lab work done in quite some time.  The patient is not up-to-date with health maintenance screenings.  Previous lab work was reviewed.    Review of Systems   Constitutional:  Positive for

## 2024-05-03 ENCOUNTER — CARE COORDINATION (OUTPATIENT)
Dept: CARE COORDINATION | Age: 64
End: 2024-05-03

## 2024-05-03 NOTE — CARE COORDINATION
[facility] placed follow up call to Lory's  Wai.   Wai reports he was able to get Lory placed last week into the SNF.  He reports things are going well there and that Lory adjusted to being there really well.   Wai reports he is doing well with the decision to place her and feels things are moving in the right direction.      [facility] processed with Wai that [facility] will sign off today.   Wai verbalized agreement.    [facility] to sign off